# Patient Record
Sex: FEMALE | Race: OTHER | NOT HISPANIC OR LATINO | URBAN - METROPOLITAN AREA
[De-identification: names, ages, dates, MRNs, and addresses within clinical notes are randomized per-mention and may not be internally consistent; named-entity substitution may affect disease eponyms.]

---

## 2024-06-26 ENCOUNTER — INPATIENT (INPATIENT)
Facility: HOSPITAL | Age: 44
LOS: 9 days | Discharge: HOME | DRG: 64 | End: 2024-07-06
Attending: NEUROLOGICAL SURGERY | Admitting: NEUROLOGICAL SURGERY
Payer: MEDICAID

## 2024-06-26 VITALS
DIASTOLIC BLOOD PRESSURE: 85 MMHG | TEMPERATURE: 99 F | SYSTOLIC BLOOD PRESSURE: 159 MMHG | RESPIRATION RATE: 18 BRPM | OXYGEN SATURATION: 100 % | HEART RATE: 93 BPM

## 2024-06-26 DIAGNOSIS — J45.909 UNSPECIFIED ASTHMA, UNCOMPLICATED: ICD-10-CM

## 2024-06-26 DIAGNOSIS — I60.9 NONTRAUMATIC SUBARACHNOID HEMORRHAGE, UNSPECIFIED: ICD-10-CM

## 2024-06-26 DIAGNOSIS — I67.1 CEREBRAL ANEURYSM, NONRUPTURED: ICD-10-CM

## 2024-06-26 DIAGNOSIS — K29.50 UNSPECIFIED CHRONIC GASTRITIS WITHOUT BLEEDING: ICD-10-CM

## 2024-06-26 DIAGNOSIS — I67.841 REVERSIBLE CEREBROVASCULAR VASOCONSTRICTION SYNDROME: ICD-10-CM

## 2024-06-26 DIAGNOSIS — D64.9 ANEMIA, UNSPECIFIED: ICD-10-CM

## 2024-06-26 DIAGNOSIS — D72.829 ELEVATED WHITE BLOOD CELL COUNT, UNSPECIFIED: ICD-10-CM

## 2024-06-26 DIAGNOSIS — I67.82 CEREBRAL ISCHEMIA: ICD-10-CM

## 2024-06-26 DIAGNOSIS — R00.1 BRADYCARDIA, UNSPECIFIED: ICD-10-CM

## 2024-06-26 DIAGNOSIS — K21.9 GASTRO-ESOPHAGEAL REFLUX DISEASE WITHOUT ESOPHAGITIS: ICD-10-CM

## 2024-06-26 DIAGNOSIS — I62.01 NONTRAUMATIC ACUTE SUBDURAL HEMORRHAGE: ICD-10-CM

## 2024-06-26 DIAGNOSIS — G91.9 HYDROCEPHALUS, UNSPECIFIED: ICD-10-CM

## 2024-06-26 DIAGNOSIS — T38.0X5A ADVERSE EFFECT OF GLUCOCORTICOIDS AND SYNTHETIC ANALOGUES, INITIAL ENCOUNTER: ICD-10-CM

## 2024-06-26 DIAGNOSIS — G93.5 COMPRESSION OF BRAIN: ICD-10-CM

## 2024-06-26 LAB
A1C WITH ESTIMATED AVERAGE GLUCOSE RESULT: 5.1 % — SIGNIFICANT CHANGE UP (ref 4–5.6)
ADD ON TEST-SPECIMEN IN LAB: SIGNIFICANT CHANGE UP
ALBUMIN SERPL ELPH-MCNC: 4.9 G/DL — SIGNIFICANT CHANGE UP (ref 3.3–5)
ALP SERPL-CCNC: 72 U/L — SIGNIFICANT CHANGE UP (ref 40–120)
ALT FLD-CCNC: 14 U/L — SIGNIFICANT CHANGE UP (ref 10–45)
ANION GAP SERPL CALC-SCNC: 13 MMOL/L — SIGNIFICANT CHANGE UP (ref 5–17)
ANION GAP SERPL CALC-SCNC: 16 MMOL/L — SIGNIFICANT CHANGE UP (ref 5–17)
ANISOCYTOSIS BLD QL: SLIGHT — SIGNIFICANT CHANGE UP
APTT BLD: 32.9 SEC — SIGNIFICANT CHANGE UP (ref 24.5–35.6)
APTT BLD: 45.3 SEC — HIGH (ref 24.5–35.6)
AST SERPL-CCNC: 17 U/L — SIGNIFICANT CHANGE UP (ref 10–40)
BASOPHILS # BLD AUTO: 0 K/UL — SIGNIFICANT CHANGE UP (ref 0–0.2)
BASOPHILS # BLD AUTO: 0.04 K/UL — SIGNIFICANT CHANGE UP (ref 0–0.2)
BASOPHILS NFR BLD AUTO: 0 % — SIGNIFICANT CHANGE UP (ref 0–2)
BASOPHILS NFR BLD AUTO: 0.3 % — SIGNIFICANT CHANGE UP (ref 0–2)
BILIRUB SERPL-MCNC: 0.9 MG/DL — SIGNIFICANT CHANGE UP (ref 0.2–1.2)
BLD GP AB SCN SERPL QL: NEGATIVE — SIGNIFICANT CHANGE UP
BUN SERPL-MCNC: 6 MG/DL — LOW (ref 7–23)
BUN SERPL-MCNC: 7 MG/DL — SIGNIFICANT CHANGE UP (ref 7–23)
CALCIUM SERPL-MCNC: 10 MG/DL — SIGNIFICANT CHANGE UP (ref 8.4–10.5)
CALCIUM SERPL-MCNC: 8.5 MG/DL — SIGNIFICANT CHANGE UP (ref 8.4–10.5)
CHLORIDE SERPL-SCNC: 100 MMOL/L — SIGNIFICANT CHANGE UP (ref 96–108)
CHLORIDE SERPL-SCNC: 97 MMOL/L — SIGNIFICANT CHANGE UP (ref 96–108)
CHOLEST SERPL-MCNC: 226 MG/DL — HIGH
CO2 SERPL-SCNC: 21 MMOL/L — LOW (ref 22–31)
CO2 SERPL-SCNC: 24 MMOL/L — SIGNIFICANT CHANGE UP (ref 22–31)
CREAT SERPL-MCNC: 0.75 MG/DL — SIGNIFICANT CHANGE UP (ref 0.5–1.3)
CREAT SERPL-MCNC: 0.87 MG/DL — SIGNIFICANT CHANGE UP (ref 0.5–1.3)
EGFR: 101 ML/MIN/1.73M2 — SIGNIFICANT CHANGE UP
EGFR: 101 ML/MIN/1.73M2 — SIGNIFICANT CHANGE UP
EGFR: 84 ML/MIN/1.73M2 — SIGNIFICANT CHANGE UP
EGFR: 84 ML/MIN/1.73M2 — SIGNIFICANT CHANGE UP
EOSINOPHIL # BLD AUTO: 0 K/UL — SIGNIFICANT CHANGE UP (ref 0–0.5)
EOSINOPHIL # BLD AUTO: 0 K/UL — SIGNIFICANT CHANGE UP (ref 0–0.5)
EOSINOPHIL NFR BLD AUTO: 0 % — SIGNIFICANT CHANGE UP (ref 0–6)
EOSINOPHIL NFR BLD AUTO: 0 % — SIGNIFICANT CHANGE UP (ref 0–6)
ESTIMATED AVERAGE GLUCOSE: 100 MG/DL — SIGNIFICANT CHANGE UP (ref 68–114)
GIANT PLATELETS BLD QL SMEAR: PRESENT — SIGNIFICANT CHANGE UP
GLUCOSE BLDC GLUCOMTR-MCNC: 175 MG/DL — HIGH (ref 70–99)
GLUCOSE SERPL-MCNC: 115 MG/DL — HIGH (ref 70–99)
GLUCOSE SERPL-MCNC: 134 MG/DL — HIGH (ref 70–99)
HCG SERPL-ACNC: <1 MIU/ML — SIGNIFICANT CHANGE UP
HCT VFR BLD CALC: 32.1 % — LOW (ref 34.5–45)
HCT VFR BLD CALC: 38.4 % — SIGNIFICANT CHANGE UP (ref 34.5–45)
HDLC SERPL-MCNC: 89 MG/DL — SIGNIFICANT CHANGE UP
HGB BLD-MCNC: 10.8 G/DL — LOW (ref 11.5–15.5)
HGB BLD-MCNC: 13.2 G/DL — SIGNIFICANT CHANGE UP (ref 11.5–15.5)
IMM GRANULOCYTES NFR BLD AUTO: 0.6 % — SIGNIFICANT CHANGE UP (ref 0–0.9)
INR BLD: 1.06 — SIGNIFICANT CHANGE UP (ref 0.85–1.18)
ISTAT ACTK (ACTIVATED CLOTTING TIME KAOLIN): 134 SEC — SIGNIFICANT CHANGE UP (ref 74–137)
LDLC SERPL-MCNC: 128 MG/DL — HIGH
LIPID PNL WITH DIRECT LDL SERPL: 128 MG/DL — HIGH
LYMPHOCYTES # BLD AUTO: 1.02 K/UL — SIGNIFICANT CHANGE UP (ref 1–3.3)
LYMPHOCYTES # BLD AUTO: 1.2 K/UL — SIGNIFICANT CHANGE UP (ref 1–3.3)
LYMPHOCYTES # BLD AUTO: 9.5 % — LOW (ref 13–44)
LYMPHOCYTES # BLD AUTO: 9.7 % — LOW (ref 13–44)
MACROCYTES BLD QL: SLIGHT — SIGNIFICANT CHANGE UP
MAGNESIUM SERPL-MCNC: 1.8 MG/DL — SIGNIFICANT CHANGE UP (ref 1.6–2.6)
MAGNESIUM SERPL-MCNC: 2 MG/DL — SIGNIFICANT CHANGE UP (ref 1.6–2.6)
MANUAL SMEAR VERIFICATION: SIGNIFICANT CHANGE UP
MCHC RBC-ENTMCNC: 28.3 PG — SIGNIFICANT CHANGE UP (ref 27–34)
MCHC RBC-ENTMCNC: 28.3 PG — SIGNIFICANT CHANGE UP (ref 27–34)
MCHC RBC-ENTMCNC: 33.6 GM/DL — SIGNIFICANT CHANGE UP (ref 32–36)
MCHC RBC-ENTMCNC: 34.4 GM/DL — SIGNIFICANT CHANGE UP (ref 32–36)
MCV RBC AUTO: 82.4 FL — SIGNIFICANT CHANGE UP (ref 80–100)
MCV RBC AUTO: 84 FL — SIGNIFICANT CHANGE UP (ref 80–100)
MONOCYTES # BLD AUTO: 0.19 K/UL — SIGNIFICANT CHANGE UP (ref 0–0.9)
MONOCYTES # BLD AUTO: 0.74 K/UL — SIGNIFICANT CHANGE UP (ref 0–0.9)
MONOCYTES NFR BLD AUTO: 1.8 % — LOW (ref 2–14)
MONOCYTES NFR BLD AUTO: 5.8 % — SIGNIFICANT CHANGE UP (ref 2–14)
NEUTROPHILS # BLD AUTO: 10.64 K/UL — HIGH (ref 1.8–7.4)
NEUTROPHILS # BLD AUTO: 9.35 K/UL — HIGH (ref 1.8–7.4)
NEUTROPHILS NFR BLD AUTO: 83.8 % — HIGH (ref 43–77)
NEUTROPHILS NFR BLD AUTO: 88.5 % — HIGH (ref 43–77)
NONHDLC SERPL-MCNC: 137 MG/DL — HIGH
NRBC # BLD: 0 /100 WBCS — SIGNIFICANT CHANGE UP (ref 0–0)
NRBC BLD-RTO: 0 /100 WBCS — SIGNIFICANT CHANGE UP (ref 0–0)
PHOSPHATE SERPL-MCNC: 0.8 MG/DL — CRITICAL LOW (ref 2.5–4.5)
PHOSPHATE SERPL-MCNC: 4.9 MG/DL — HIGH (ref 2.5–4.5)
PLAT MORPH BLD: NORMAL — SIGNIFICANT CHANGE UP
PLATELET # BLD AUTO: 200 K/UL — SIGNIFICANT CHANGE UP (ref 150–400)
PLATELET # BLD AUTO: 246 K/UL — SIGNIFICANT CHANGE UP (ref 150–400)
POTASSIUM SERPL-MCNC: 3.2 MMOL/L — LOW (ref 3.5–5.3)
POTASSIUM SERPL-MCNC: 3.4 MMOL/L — LOW (ref 3.5–5.3)
POTASSIUM SERPL-SCNC: 3.2 MMOL/L — LOW (ref 3.5–5.3)
POTASSIUM SERPL-SCNC: 3.4 MMOL/L — LOW (ref 3.5–5.3)
PROT SERPL-MCNC: 8.5 G/DL — HIGH (ref 6–8.3)
PROTHROM AB SERPL-ACNC: 12.1 SEC — SIGNIFICANT CHANGE UP (ref 9.5–13)
RBC # BLD: 3.82 M/UL — SIGNIFICANT CHANGE UP (ref 3.8–5.2)
RBC # BLD: 4.66 M/UL — SIGNIFICANT CHANGE UP (ref 3.8–5.2)
RBC # FLD: 15.5 % — HIGH (ref 10.3–14.5)
RBC # FLD: 15.5 % — HIGH (ref 10.3–14.5)
RBC BLD AUTO: ABNORMAL
RH IG SCN BLD-IMP: POSITIVE — SIGNIFICANT CHANGE UP
SODIUM SERPL-SCNC: 134 MMOL/L — LOW (ref 135–145)
SODIUM SERPL-SCNC: 137 MMOL/L — SIGNIFICANT CHANGE UP (ref 135–145)
TRIGL SERPL-MCNC: 55 MG/DL — SIGNIFICANT CHANGE UP
WBC # BLD: 10.56 K/UL — HIGH (ref 3.8–10.5)
WBC # BLD: 12.69 K/UL — HIGH (ref 3.8–10.5)
WBC # FLD AUTO: 10.56 K/UL — HIGH (ref 3.8–10.5)
WBC # FLD AUTO: 12.69 K/UL — HIGH (ref 3.8–10.5)

## 2024-06-26 PROCEDURE — 70450 CT HEAD/BRAIN W/O DYE: CPT | Mod: 26

## 2024-06-26 PROCEDURE — 76377 3D RENDER W/INTRP POSTPROCES: CPT | Mod: 26

## 2024-06-26 PROCEDURE — 99291 CRITICAL CARE FIRST HOUR: CPT

## 2024-06-26 PROCEDURE — 36224 PLACE CATH CAROTD ART: CPT | Mod: 50

## 2024-06-26 PROCEDURE — 36226 PLACE CATH VERTEBRAL ART: CPT | Mod: LT

## 2024-06-26 PROCEDURE — 36227 PLACE CATH XTRNL CAROTID: CPT | Mod: 50

## 2024-06-26 PROCEDURE — 71045 X-RAY EXAM CHEST 1 VIEW: CPT | Mod: 26

## 2024-06-26 PROCEDURE — 76937 US GUIDE VASCULAR ACCESS: CPT | Mod: 26

## 2024-06-26 RX ORDER — ACETAMINOPHEN 500 MG/5ML
1000 LIQUID (ML) ORAL ONCE
Refills: 0 | Status: DISCONTINUED | OUTPATIENT
Start: 2024-06-26 | End: 2024-06-26

## 2024-06-26 RX ORDER — LEVETIRACETAM 10 MG/ML
500 INJECTION, SOLUTION INTRAVENOUS
Refills: 0 | Status: DISCONTINUED | OUTPATIENT
Start: 2024-06-26 | End: 2024-06-26

## 2024-06-26 RX ORDER — MAGNESIUM SULFATE 500 MG/ML
2 SYRINGE (ML) INJECTION ONCE
Refills: 0 | Status: COMPLETED | OUTPATIENT
Start: 2024-06-26 | End: 2024-06-26

## 2024-06-26 RX ORDER — OXYCODONE HYDROCHLORIDE 30 MG/1
5 TABLET ORAL ONCE
Refills: 0 | Status: DISCONTINUED | OUTPATIENT
Start: 2024-06-26 | End: 2024-06-26

## 2024-06-26 RX ORDER — INSULIN LISPRO 100 U/ML
INJECTION, SOLUTION INTRAVENOUS; SUBCUTANEOUS EVERY 6 HOURS
Refills: 0 | Status: DISCONTINUED | OUTPATIENT
Start: 2024-06-26 | End: 2024-06-28

## 2024-06-26 RX ORDER — DEXAMETHASONE 0.5 MG/1
10 TABLET ORAL ONCE
Refills: 0 | Status: DISCONTINUED | OUTPATIENT
Start: 2024-06-26 | End: 2024-06-26

## 2024-06-26 RX ORDER — ACETAMINOPHEN 500 MG/5ML
650 LIQUID (ML) ORAL EVERY 6 HOURS
Refills: 0 | Status: DISCONTINUED | OUTPATIENT
Start: 2024-06-26 | End: 2024-07-06

## 2024-06-26 RX ORDER — NICARDIPINE HCL 30 MG
5 CAPSULE ORAL
Qty: 40 | Refills: 0 | Status: DISCONTINUED | OUTPATIENT
Start: 2024-06-26 | End: 2024-06-27

## 2024-06-26 RX ORDER — AMINOCAPROIC ACID 0.25 G/ML
5 SOLUTION ORAL ONCE
Refills: 0 | Status: COMPLETED | OUTPATIENT
Start: 2024-06-26 | End: 2024-06-26

## 2024-06-26 RX ORDER — ACETAMINOPHEN 500 MG/5ML
1000 LIQUID (ML) ORAL ONCE
Refills: 0 | Status: COMPLETED | OUTPATIENT
Start: 2024-06-26 | End: 2024-06-26

## 2024-06-26 RX ORDER — AMINOCAPROIC ACID 0.25 G/ML
1 SOLUTION ORAL
Qty: 5 | Refills: 0 | Status: DISCONTINUED | OUTPATIENT
Start: 2024-06-26 | End: 2024-06-27

## 2024-06-26 RX ORDER — HYDROMORPHONE/SOD CHLOR,ISO/PF 2 MG/10 ML
0.5 SYRINGE (ML) INJECTION ONCE
Refills: 0 | Status: DISCONTINUED | OUTPATIENT
Start: 2024-06-26 | End: 2024-06-26

## 2024-06-26 RX ORDER — HYDROMORPHONE/SOD CHLOR,ISO/PF 2 MG/10 ML
0.25 SYRINGE (ML) INJECTION ONCE
Refills: 0 | Status: DISCONTINUED | OUTPATIENT
Start: 2024-06-26 | End: 2024-06-26

## 2024-06-26 RX ORDER — TRAMADOL HYDROCHLORIDE 50 MG/1
25 TABLET, FILM COATED ORAL ONCE
Refills: 0 | Status: DISCONTINUED | OUTPATIENT
Start: 2024-06-26 | End: 2024-06-26

## 2024-06-26 RX ORDER — OXYCODONE HYDROCHLORIDE 30 MG/1
10 TABLET ORAL EVERY 4 HOURS
Refills: 0 | Status: DISCONTINUED | OUTPATIENT
Start: 2024-06-26 | End: 2024-07-02

## 2024-06-26 RX ORDER — OXYCODONE HYDROCHLORIDE 30 MG/1
5 TABLET ORAL EVERY 4 HOURS
Refills: 0 | Status: DISCONTINUED | OUTPATIENT
Start: 2024-06-26 | End: 2024-07-02

## 2024-06-26 RX ORDER — IPRATROPIUM BROMIDE AND ALBUTEROL SULFATE .5; 2.5 MG/3ML; MG/3ML
3 SOLUTION RESPIRATORY (INHALATION) EVERY 6 HOURS
Refills: 0 | Status: DISCONTINUED | OUTPATIENT
Start: 2024-06-26 | End: 2024-06-27

## 2024-06-26 RX ORDER — ONDANSETRON HCL/PF 4 MG/2 ML
4 VIAL (ML) INJECTION EVERY 6 HOURS
Refills: 0 | Status: DISCONTINUED | OUTPATIENT
Start: 2024-06-26 | End: 2024-06-27

## 2024-06-26 RX ORDER — LEVETIRACETAM 10 MG/ML
500 INJECTION, SOLUTION INTRAVENOUS EVERY 12 HOURS
Refills: 0 | Status: DISCONTINUED | OUTPATIENT
Start: 2024-06-26 | End: 2024-06-28

## 2024-06-26 RX ORDER — NIMODIPINE 30 MG/1
60 CAPSULE, LIQUID FILLED ORAL EVERY 4 HOURS
Refills: 0 | Status: DISCONTINUED | OUTPATIENT
Start: 2024-06-26 | End: 2024-06-27

## 2024-06-26 RX ADMIN — Medication 1000 MILLIGRAM(S): at 15:56

## 2024-06-26 RX ADMIN — NIMODIPINE 60 MILLIGRAM(S): 30 CAPSULE, LIQUID FILLED ORAL at 14:33

## 2024-06-26 RX ADMIN — LEVETIRACETAM 600 MILLIGRAM(S): 10 INJECTION, SOLUTION INTRAVENOUS at 15:42

## 2024-06-26 RX ADMIN — Medication 75 MILLILITER(S): at 15:08

## 2024-06-26 RX ADMIN — Medication 400 MILLIGRAM(S): at 15:33

## 2024-06-26 RX ADMIN — Medication 0.25 MILLIGRAM(S): at 16:40

## 2024-06-26 RX ADMIN — Medication 1000 MILLIGRAM(S): at 16:15

## 2024-06-26 RX ADMIN — OXYCODONE HYDROCHLORIDE 5 MILLIGRAM(S): 30 TABLET ORAL at 21:30

## 2024-06-26 RX ADMIN — NIMODIPINE 60 MILLIGRAM(S): 30 CAPSULE, LIQUID FILLED ORAL at 21:41

## 2024-06-26 RX ADMIN — OXYCODONE HYDROCHLORIDE 5 MILLIGRAM(S): 30 TABLET ORAL at 22:23

## 2024-06-26 RX ADMIN — Medication 400 MILLIGRAM(S): at 20:49

## 2024-06-26 RX ADMIN — Medication 25 GRAM(S): at 21:30

## 2024-06-26 RX ADMIN — INSULIN LISPRO 2: 100 INJECTION, SOLUTION INTRAVENOUS; SUBCUTANEOUS at 21:32

## 2024-06-26 RX ADMIN — OXYCODONE HYDROCHLORIDE 5 MILLIGRAM(S): 30 TABLET ORAL at 15:33

## 2024-06-26 RX ADMIN — Medication 0.25 MILLIGRAM(S): at 16:26

## 2024-06-26 RX ADMIN — Medication 40 MILLIEQUIVALENT(S): at 20:49

## 2024-06-26 RX ADMIN — Medication 4 MILLIGRAM(S): at 14:33

## 2024-06-26 RX ADMIN — OXYCODONE HYDROCHLORIDE 5 MILLIGRAM(S): 30 TABLET ORAL at 14:33

## 2024-06-26 RX ADMIN — AMINOCAPROIC ACID 250 GRAM(S): 0.25 SOLUTION ORAL at 20:48

## 2024-06-26 RX ADMIN — Medication 1 APPLICATION(S): at 16:00

## 2024-06-26 RX ADMIN — Medication 5 MILLIGRAM(S): at 19:45

## 2024-06-26 RX ADMIN — Medication 400 MILLIGRAM(S): at 15:26

## 2024-06-26 RX ADMIN — IPRATROPIUM BROMIDE AND ALBUTEROL SULFATE 3 MILLILITER(S): .5; 2.5 SOLUTION RESPIRATORY (INHALATION) at 22:01

## 2024-06-26 RX ADMIN — IPRATROPIUM BROMIDE AND ALBUTEROL SULFATE 3 MILLILITER(S): .5; 2.5 SOLUTION RESPIRATORY (INHALATION) at 19:41

## 2024-06-26 RX ADMIN — Medication 1000 MILLIGRAM(S): at 21:19

## 2024-06-26 RX ADMIN — AMINOCAPROIC ACID 50 GM/HR: 0.25 SOLUTION ORAL at 21:55

## 2024-06-27 LAB
ANION GAP SERPL CALC-SCNC: 10 MMOL/L — SIGNIFICANT CHANGE UP (ref 5–17)
ANION GAP SERPL CALC-SCNC: 10 MMOL/L — SIGNIFICANT CHANGE UP (ref 5–17)
ANION GAP SERPL CALC-SCNC: 6 MMOL/L — SIGNIFICANT CHANGE UP (ref 5–17)
BUN SERPL-MCNC: 6 MG/DL — LOW (ref 7–23)
BUN SERPL-MCNC: 7 MG/DL — SIGNIFICANT CHANGE UP (ref 7–23)
BUN SERPL-MCNC: 7 MG/DL — SIGNIFICANT CHANGE UP (ref 7–23)
CALCIUM SERPL-MCNC: 8 MG/DL — LOW (ref 8.4–10.5)
CALCIUM SERPL-MCNC: 8.3 MG/DL — LOW (ref 8.4–10.5)
CALCIUM SERPL-MCNC: 8.6 MG/DL — SIGNIFICANT CHANGE UP (ref 8.4–10.5)
CHLORIDE SERPL-SCNC: 102 MMOL/L — SIGNIFICANT CHANGE UP (ref 96–108)
CHLORIDE SERPL-SCNC: 103 MMOL/L — SIGNIFICANT CHANGE UP (ref 96–108)
CHLORIDE SERPL-SCNC: 110 MMOL/L — HIGH (ref 96–108)
CO2 SERPL-SCNC: 20 MMOL/L — LOW (ref 22–31)
CO2 SERPL-SCNC: 21 MMOL/L — LOW (ref 22–31)
CO2 SERPL-SCNC: 22 MMOL/L — SIGNIFICANT CHANGE UP (ref 22–31)
CREAT SERPL-MCNC: 0.68 MG/DL — SIGNIFICANT CHANGE UP (ref 0.5–1.3)
CREAT SERPL-MCNC: 0.71 MG/DL — SIGNIFICANT CHANGE UP (ref 0.5–1.3)
CREAT SERPL-MCNC: 0.71 MG/DL — SIGNIFICANT CHANGE UP (ref 0.5–1.3)
EGFR: 107 ML/MIN/1.73M2 — SIGNIFICANT CHANGE UP
EGFR: 110 ML/MIN/1.73M2 — SIGNIFICANT CHANGE UP
EGFR: 110 ML/MIN/1.73M2 — SIGNIFICANT CHANGE UP
GLUCOSE BLDC GLUCOMTR-MCNC: 108 MG/DL — HIGH (ref 70–99)
GLUCOSE BLDC GLUCOMTR-MCNC: 110 MG/DL — HIGH (ref 70–99)
GLUCOSE BLDC GLUCOMTR-MCNC: 111 MG/DL — HIGH (ref 70–99)
GLUCOSE BLDC GLUCOMTR-MCNC: 156 MG/DL — HIGH (ref 70–99)
GLUCOSE SERPL-MCNC: 112 MG/DL — HIGH (ref 70–99)
GLUCOSE SERPL-MCNC: 118 MG/DL — HIGH (ref 70–99)
GLUCOSE SERPL-MCNC: 154 MG/DL — HIGH (ref 70–99)
HCT VFR BLD CALC: 29.6 % — LOW (ref 34.5–45)
HGB BLD-MCNC: 10 G/DL — LOW (ref 11.5–15.5)
MAGNESIUM SERPL-MCNC: 2.4 MG/DL — SIGNIFICANT CHANGE UP (ref 1.6–2.6)
MCHC RBC-ENTMCNC: 28.2 PG — SIGNIFICANT CHANGE UP (ref 27–34)
MCHC RBC-ENTMCNC: 33.8 GM/DL — SIGNIFICANT CHANGE UP (ref 32–36)
MCV RBC AUTO: 83.4 FL — SIGNIFICANT CHANGE UP (ref 80–100)
NRBC # BLD: 0 /100 WBCS — SIGNIFICANT CHANGE UP (ref 0–0)
NRBC BLD-RTO: 0 /100 WBCS — SIGNIFICANT CHANGE UP (ref 0–0)
PHOSPHATE SERPL-MCNC: 3 MG/DL — SIGNIFICANT CHANGE UP (ref 2.5–4.5)
PLATELET # BLD AUTO: 186 K/UL — SIGNIFICANT CHANGE UP (ref 150–400)
POTASSIUM SERPL-MCNC: 3.3 MMOL/L — LOW (ref 3.5–5.3)
POTASSIUM SERPL-MCNC: 4 MMOL/L — SIGNIFICANT CHANGE UP (ref 3.5–5.3)
POTASSIUM SERPL-MCNC: 4.2 MMOL/L — SIGNIFICANT CHANGE UP (ref 3.5–5.3)
POTASSIUM SERPL-SCNC: 3.3 MMOL/L — LOW (ref 3.5–5.3)
POTASSIUM SERPL-SCNC: 4 MMOL/L — SIGNIFICANT CHANGE UP (ref 3.5–5.3)
POTASSIUM SERPL-SCNC: 4.2 MMOL/L — SIGNIFICANT CHANGE UP (ref 3.5–5.3)
RBC # BLD: 3.55 M/UL — LOW (ref 3.8–5.2)
RBC # FLD: 15.5 % — HIGH (ref 10.3–14.5)
SODIUM SERPL-SCNC: 133 MMOL/L — LOW (ref 135–145)
SODIUM SERPL-SCNC: 133 MMOL/L — LOW (ref 135–145)
SODIUM SERPL-SCNC: 138 MMOL/L — SIGNIFICANT CHANGE UP (ref 135–145)
TROPONIN T, HIGH SENSITIVITY RESULT: 11 NG/L — SIGNIFICANT CHANGE UP (ref 0–51)
TSH SERPL-MCNC: 0.49 UIU/ML — SIGNIFICANT CHANGE UP (ref 0.27–4.2)
WBC # BLD: 12.75 K/UL — HIGH (ref 3.8–10.5)
WBC # FLD AUTO: 12.75 K/UL — HIGH (ref 3.8–10.5)

## 2024-06-27 PROCEDURE — 99291 CRITICAL CARE FIRST HOUR: CPT

## 2024-06-27 PROCEDURE — 93306 TTE W/DOPPLER COMPLETE: CPT | Mod: 26

## 2024-06-27 PROCEDURE — 72156 MRI NECK SPINE W/O & W/DYE: CPT | Mod: 26

## 2024-06-27 PROCEDURE — 70553 MRI BRAIN STEM W/O & W/DYE: CPT | Mod: 26

## 2024-06-27 PROCEDURE — 70450 CT HEAD/BRAIN W/O DYE: CPT | Mod: 26

## 2024-06-27 PROCEDURE — 70450 CT HEAD/BRAIN W/O DYE: CPT | Mod: 26,77

## 2024-06-27 RX ORDER — ACETAMINOPHEN 500 MG/5ML
1000 LIQUID (ML) ORAL ONCE
Refills: 0 | Status: COMPLETED | OUTPATIENT
Start: 2024-06-27 | End: 2024-06-27

## 2024-06-27 RX ORDER — SCOPOLAMINE 1 MG/3D
1 PATCH, EXTENDED RELEASE TRANSDERMAL ONCE
Refills: 0 | Status: COMPLETED | OUTPATIENT
Start: 2024-06-27 | End: 2024-06-27

## 2024-06-27 RX ORDER — HYDROMORPHONE/SOD CHLOR,ISO/PF 2 MG/10 ML
0.25 SYRINGE (ML) INJECTION ONCE
Refills: 0 | Status: DISCONTINUED | OUTPATIENT
Start: 2024-06-27 | End: 2024-06-27

## 2024-06-27 RX ORDER — IPRATROPIUM BROMIDE AND ALBUTEROL SULFATE .5; 2.5 MG/3ML; MG/3ML
3 SOLUTION RESPIRATORY (INHALATION) EVERY 6 HOURS
Refills: 0 | Status: DISCONTINUED | OUTPATIENT
Start: 2024-06-27 | End: 2024-07-06

## 2024-06-27 RX ORDER — SODIUM CHLORIDE 3 G/100ML
200 INJECTION, SOLUTION INTRAVENOUS ONCE
Refills: 0 | Status: COMPLETED | OUTPATIENT
Start: 2024-06-27 | End: 2024-06-27

## 2024-06-27 RX ORDER — ONDANSETRON HCL/PF 4 MG/2 ML
4 VIAL (ML) INJECTION EVERY 6 HOURS
Refills: 0 | Status: DISCONTINUED | OUTPATIENT
Start: 2024-06-27 | End: 2024-06-27

## 2024-06-27 RX ORDER — BUTALBITAL, ACETAMINOPHEN AND CAFFEINE 50; 325; 40 MG/1; MG/1; MG/1
1 TABLET ORAL EVERY 4 HOURS
Refills: 0 | Status: DISCONTINUED | OUTPATIENT
Start: 2024-06-27 | End: 2024-07-06

## 2024-06-27 RX ORDER — METOCLOPRAMIDE HCL 10 MG
10 TABLET ORAL ONCE
Refills: 0 | Status: COMPLETED | OUTPATIENT
Start: 2024-06-27 | End: 2024-06-27

## 2024-06-27 RX ORDER — SODIUM CHLORIDE 3 G/100ML
500 INJECTION, SOLUTION INTRAVENOUS
Refills: 0 | Status: DISCONTINUED | OUTPATIENT
Start: 2024-06-27 | End: 2024-06-27

## 2024-06-27 RX ORDER — DEXAMETHASONE 0.5 MG/1
2 TABLET ORAL EVERY 12 HOURS
Refills: 0 | Status: COMPLETED | OUTPATIENT
Start: 2024-06-30 | End: 2024-06-30

## 2024-06-27 RX ORDER — DEXAMETHASONE 0.5 MG/1
4 TABLET ORAL EVERY 6 HOURS
Refills: 0 | Status: COMPLETED | OUTPATIENT
Start: 2024-06-27 | End: 2024-06-28

## 2024-06-27 RX ORDER — ONDANSETRON HCL/PF 4 MG/2 ML
4 VIAL (ML) INJECTION EVERY 6 HOURS
Refills: 0 | Status: DISCONTINUED | OUTPATIENT
Start: 2024-06-27 | End: 2024-07-01

## 2024-06-27 RX ORDER — SENNA 187 MG
2 TABLET ORAL AT BEDTIME
Refills: 0 | Status: DISCONTINUED | OUTPATIENT
Start: 2024-06-27 | End: 2024-07-01

## 2024-06-27 RX ORDER — DEXAMETHASONE 0.5 MG/1
6 TABLET ORAL ONCE
Refills: 0 | Status: COMPLETED | OUTPATIENT
Start: 2024-06-27 | End: 2024-06-27

## 2024-06-27 RX ORDER — DEXAMETHASONE 0.5 MG/1
4 TABLET ORAL EVERY 12 HOURS
Refills: 0 | Status: COMPLETED | OUTPATIENT
Start: 2024-06-29 | End: 2024-06-29

## 2024-06-27 RX ORDER — SODIUM CHLORIDE 3 G/100ML
500 INJECTION, SOLUTION INTRAVENOUS
Refills: 0 | Status: DISCONTINUED | OUTPATIENT
Start: 2024-06-27 | End: 2024-06-29

## 2024-06-27 RX ORDER — NIMODIPINE 30 MG/1
30 CAPSULE, LIQUID FILLED ORAL
Refills: 0 | Status: DISCONTINUED | OUTPATIENT
Start: 2024-06-27 | End: 2024-06-29

## 2024-06-27 RX ORDER — DEXAMETHASONE 0.5 MG/1
TABLET ORAL
Refills: 0 | Status: COMPLETED | OUTPATIENT
Start: 2024-06-27 | End: 2024-07-01

## 2024-06-27 RX ADMIN — OXYCODONE HYDROCHLORIDE 10 MILLIGRAM(S): 30 TABLET ORAL at 11:04

## 2024-06-27 RX ADMIN — OXYCODONE HYDROCHLORIDE 5 MILLIGRAM(S): 30 TABLET ORAL at 00:15

## 2024-06-27 RX ADMIN — INSULIN LISPRO 2: 100 INJECTION, SOLUTION INTRAVENOUS; SUBCUTANEOUS at 06:55

## 2024-06-27 RX ADMIN — IPRATROPIUM BROMIDE AND ALBUTEROL SULFATE 3 MILLILITER(S): .5; 2.5 SOLUTION RESPIRATORY (INHALATION) at 05:37

## 2024-06-27 RX ADMIN — Medication 10 MILLIGRAM(S): at 15:34

## 2024-06-27 RX ADMIN — Medication 40 MILLIEQUIVALENT(S): at 06:46

## 2024-06-27 RX ADMIN — OXYCODONE HYDROCHLORIDE 10 MILLIGRAM(S): 30 TABLET ORAL at 11:15

## 2024-06-27 RX ADMIN — LEVETIRACETAM 600 MILLIGRAM(S): 10 INJECTION, SOLUTION INTRAVENOUS at 17:25

## 2024-06-27 RX ADMIN — SODIUM CHLORIDE 30 MILLILITER(S): 3 INJECTION, SOLUTION INTRAVENOUS at 06:56

## 2024-06-27 RX ADMIN — Medication 1000 MILLIGRAM(S): at 17:15

## 2024-06-27 RX ADMIN — BUTALBITAL, ACETAMINOPHEN AND CAFFEINE 1 CAPSULE(S): 50; 325; 40 TABLET ORAL at 21:00

## 2024-06-27 RX ADMIN — BUTALBITAL, ACETAMINOPHEN AND CAFFEINE 1 CAPSULE(S): 50; 325; 40 TABLET ORAL at 15:33

## 2024-06-27 RX ADMIN — Medication 20 MILLIEQUIVALENT(S): at 00:15

## 2024-06-27 RX ADMIN — SODIUM CHLORIDE 200 MILLILITER(S): 3 INJECTION, SOLUTION INTRAVENOUS at 00:15

## 2024-06-27 RX ADMIN — Medication 0.25 MILLIGRAM(S): at 12:23

## 2024-06-27 RX ADMIN — Medication 400 MILLIGRAM(S): at 16:49

## 2024-06-27 RX ADMIN — Medication 40 MILLIEQUIVALENT(S): at 11:05

## 2024-06-27 RX ADMIN — Medication 1000 MILLIGRAM(S): at 04:33

## 2024-06-27 RX ADMIN — Medication 40 MILLIGRAM(S): at 12:29

## 2024-06-27 RX ADMIN — IPRATROPIUM BROMIDE AND ALBUTEROL SULFATE 3 MILLILITER(S): .5; 2.5 SOLUTION RESPIRATORY (INHALATION) at 21:06

## 2024-06-27 RX ADMIN — SCOPOLAMINE 1 PATCH: 1 PATCH, EXTENDED RELEASE TRANSDERMAL at 20:20

## 2024-06-27 RX ADMIN — Medication 200 MILLIGRAM(S): at 21:08

## 2024-06-27 RX ADMIN — Medication 1000 MILLILITER(S): at 22:01

## 2024-06-27 RX ADMIN — OXYCODONE HYDROCHLORIDE 10 MILLIGRAM(S): 30 TABLET ORAL at 19:14

## 2024-06-27 RX ADMIN — Medication 0.25 MILLIGRAM(S): at 11:49

## 2024-06-27 RX ADMIN — DEXAMETHASONE 4 MILLIGRAM(S): 0.5 TABLET ORAL at 17:06

## 2024-06-27 RX ADMIN — NIMODIPINE 30 MILLIGRAM(S): 30 CAPSULE, LIQUID FILLED ORAL at 11:04

## 2024-06-27 RX ADMIN — Medication 75 MILLILITER(S): at 20:00

## 2024-06-27 RX ADMIN — Medication 0.25 MILLIGRAM(S): at 05:00

## 2024-06-27 RX ADMIN — Medication 1 APPLICATION(S): at 11:06

## 2024-06-27 RX ADMIN — DEXAMETHASONE 6 MILLIGRAM(S): 0.5 TABLET ORAL at 00:14

## 2024-06-27 RX ADMIN — Medication 2 TABLET(S): at 21:09

## 2024-06-27 RX ADMIN — NIMODIPINE 30 MILLIGRAM(S): 30 CAPSULE, LIQUID FILLED ORAL at 13:54

## 2024-06-27 RX ADMIN — Medication 200 MILLIGRAM(S): at 15:55

## 2024-06-27 RX ADMIN — AMINOCAPROIC ACID 50 GM/HR: 0.25 SOLUTION ORAL at 04:33

## 2024-06-27 RX ADMIN — Medication 0.25 MILLIGRAM(S): at 16:59

## 2024-06-27 RX ADMIN — BUTALBITAL, ACETAMINOPHEN AND CAFFEINE 1 CAPSULE(S): 50; 325; 40 TABLET ORAL at 15:15

## 2024-06-27 RX ADMIN — NIMODIPINE 30 MILLIGRAM(S): 30 CAPSULE, LIQUID FILLED ORAL at 22:00

## 2024-06-27 RX ADMIN — OXYCODONE HYDROCHLORIDE 10 MILLIGRAM(S): 30 TABLET ORAL at 20:00

## 2024-06-27 RX ADMIN — IPRATROPIUM BROMIDE AND ALBUTEROL SULFATE 3 MILLILITER(S): .5; 2.5 SOLUTION RESPIRATORY (INHALATION) at 17:05

## 2024-06-27 RX ADMIN — BUTALBITAL, ACETAMINOPHEN AND CAFFEINE 1 CAPSULE(S): 50; 325; 40 TABLET ORAL at 20:36

## 2024-06-27 RX ADMIN — Medication 0.25 MILLIGRAM(S): at 04:33

## 2024-06-27 RX ADMIN — Medication 400 MILLIGRAM(S): at 04:16

## 2024-06-27 RX ADMIN — Medication 45 MILLILITER(S): at 06:56

## 2024-06-27 RX ADMIN — Medication 0.25 MILLIGRAM(S): at 16:49

## 2024-06-27 RX ADMIN — LEVETIRACETAM 600 MILLIGRAM(S): 10 INJECTION, SOLUTION INTRAVENOUS at 05:31

## 2024-06-27 RX ADMIN — Medication 10 MILLIGRAM(S): at 17:25

## 2024-06-28 LAB
ANION GAP SERPL CALC-SCNC: 11 MMOL/L — SIGNIFICANT CHANGE UP (ref 5–17)
ANION GAP SERPL CALC-SCNC: 8 MMOL/L — SIGNIFICANT CHANGE UP (ref 5–17)
BUN SERPL-MCNC: 10 MG/DL — SIGNIFICANT CHANGE UP (ref 7–23)
BUN SERPL-MCNC: 7 MG/DL — SIGNIFICANT CHANGE UP (ref 7–23)
CALCIUM SERPL-MCNC: 8.7 MG/DL — SIGNIFICANT CHANGE UP (ref 8.4–10.5)
CALCIUM SERPL-MCNC: 8.8 MG/DL — SIGNIFICANT CHANGE UP (ref 8.4–10.5)
CHLORIDE SERPL-SCNC: 103 MMOL/L — SIGNIFICANT CHANGE UP (ref 96–108)
CHLORIDE SERPL-SCNC: 107 MMOL/L — SIGNIFICANT CHANGE UP (ref 96–108)
CO2 SERPL-SCNC: 21 MMOL/L — LOW (ref 22–31)
CO2 SERPL-SCNC: 22 MMOL/L — SIGNIFICANT CHANGE UP (ref 22–31)
CREAT SERPL-MCNC: 0.66 MG/DL — SIGNIFICANT CHANGE UP (ref 0.5–1.3)
CREAT SERPL-MCNC: 0.71 MG/DL — SIGNIFICANT CHANGE UP (ref 0.5–1.3)
EGFR: 107 ML/MIN/1.73M2 — SIGNIFICANT CHANGE UP
EGFR: 107 ML/MIN/1.73M2 — SIGNIFICANT CHANGE UP
EGFR: 111 ML/MIN/1.73M2 — SIGNIFICANT CHANGE UP
EGFR: 111 ML/MIN/1.73M2 — SIGNIFICANT CHANGE UP
GLUCOSE BLDC GLUCOMTR-MCNC: 113 MG/DL — HIGH (ref 70–99)
GLUCOSE BLDC GLUCOMTR-MCNC: 129 MG/DL — HIGH (ref 70–99)
GLUCOSE BLDC GLUCOMTR-MCNC: 135 MG/DL — HIGH (ref 70–99)
GLUCOSE SERPL-MCNC: 126 MG/DL — HIGH (ref 70–99)
GLUCOSE SERPL-MCNC: 127 MG/DL — HIGH (ref 70–99)
HCT VFR BLD CALC: 32.2 % — LOW (ref 34.5–45)
HGB BLD-MCNC: 10.8 G/DL — LOW (ref 11.5–15.5)
MAGNESIUM SERPL-MCNC: 1.9 MG/DL — SIGNIFICANT CHANGE UP (ref 1.6–2.6)
MCHC RBC-ENTMCNC: 28.2 PG — SIGNIFICANT CHANGE UP (ref 27–34)
MCHC RBC-ENTMCNC: 33.5 GM/DL — SIGNIFICANT CHANGE UP (ref 32–36)
MCV RBC AUTO: 84.1 FL — SIGNIFICANT CHANGE UP (ref 80–100)
NRBC # BLD: 0 /100 WBCS — SIGNIFICANT CHANGE UP (ref 0–0)
NRBC BLD-RTO: 0 /100 WBCS — SIGNIFICANT CHANGE UP (ref 0–0)
OSMOLALITY UR: 396 MOSM/KG — SIGNIFICANT CHANGE UP (ref 300–900)
PHOSPHATE SERPL-MCNC: 2.6 MG/DL — SIGNIFICANT CHANGE UP (ref 2.5–4.5)
PLATELET # BLD AUTO: 190 K/UL — SIGNIFICANT CHANGE UP (ref 150–400)
POTASSIUM SERPL-MCNC: 3.8 MMOL/L — SIGNIFICANT CHANGE UP (ref 3.5–5.3)
POTASSIUM SERPL-MCNC: 4 MMOL/L — SIGNIFICANT CHANGE UP (ref 3.5–5.3)
POTASSIUM SERPL-SCNC: 3.8 MMOL/L — SIGNIFICANT CHANGE UP (ref 3.5–5.3)
POTASSIUM SERPL-SCNC: 4 MMOL/L — SIGNIFICANT CHANGE UP (ref 3.5–5.3)
RBC # BLD: 3.83 M/UL — SIGNIFICANT CHANGE UP (ref 3.8–5.2)
RBC # FLD: 16 % — HIGH (ref 10.3–14.5)
SODIUM SERPL-SCNC: 135 MMOL/L — SIGNIFICANT CHANGE UP (ref 135–145)
SODIUM SERPL-SCNC: 137 MMOL/L — SIGNIFICANT CHANGE UP (ref 135–145)
SODIUM UR-SCNC: 170 MMOL/L — SIGNIFICANT CHANGE UP
WBC # BLD: 12.1 K/UL — HIGH (ref 3.8–10.5)
WBC # FLD AUTO: 12.1 K/UL — HIGH (ref 3.8–10.5)

## 2024-06-28 PROCEDURE — 99291 CRITICAL CARE FIRST HOUR: CPT

## 2024-06-28 PROCEDURE — 99232 SBSQ HOSP IP/OBS MODERATE 35: CPT

## 2024-06-28 RX ORDER — SOD PHOS DI, MONO/K PHOS MONO 250 MG
1 TABLET ORAL ONCE
Refills: 0 | Status: COMPLETED | OUTPATIENT
Start: 2024-06-28 | End: 2024-06-28

## 2024-06-28 RX ORDER — LEVETIRACETAM 10 MG/ML
500 INJECTION, SOLUTION INTRAVENOUS EVERY 12 HOURS
Refills: 0 | Status: DISCONTINUED | OUTPATIENT
Start: 2024-06-28 | End: 2024-07-01

## 2024-06-28 RX ADMIN — NIMODIPINE 30 MILLIGRAM(S): 30 CAPSULE, LIQUID FILLED ORAL at 13:43

## 2024-06-28 RX ADMIN — OXYCODONE HYDROCHLORIDE 10 MILLIGRAM(S): 30 TABLET ORAL at 01:00

## 2024-06-28 RX ADMIN — OXYCODONE HYDROCHLORIDE 10 MILLIGRAM(S): 30 TABLET ORAL at 07:00

## 2024-06-28 RX ADMIN — Medication 3 GRAM(S): at 19:13

## 2024-06-28 RX ADMIN — SODIUM CHLORIDE 30 MILLILITER(S): 3 INJECTION, SOLUTION INTRAVENOUS at 00:04

## 2024-06-28 RX ADMIN — OXYCODONE HYDROCHLORIDE 10 MILLIGRAM(S): 30 TABLET ORAL at 18:08

## 2024-06-28 RX ADMIN — DEXAMETHASONE 4 MILLIGRAM(S): 0.5 TABLET ORAL at 00:02

## 2024-06-28 RX ADMIN — Medication 650 MILLIGRAM(S): at 05:01

## 2024-06-28 RX ADMIN — Medication 200 MILLIGRAM(S): at 03:04

## 2024-06-28 RX ADMIN — Medication 3 GRAM(S): at 05:40

## 2024-06-28 RX ADMIN — Medication 1 PACKET(S): at 07:15

## 2024-06-28 RX ADMIN — NIMODIPINE 30 MILLIGRAM(S): 30 CAPSULE, LIQUID FILLED ORAL at 19:13

## 2024-06-28 RX ADMIN — BUTALBITAL, ACETAMINOPHEN AND CAFFEINE 1 CAPSULE(S): 50; 325; 40 TABLET ORAL at 13:53

## 2024-06-28 RX ADMIN — Medication 1000 MILLILITER(S): at 05:21

## 2024-06-28 RX ADMIN — NIMODIPINE 30 MILLIGRAM(S): 30 CAPSULE, LIQUID FILLED ORAL at 06:02

## 2024-06-28 RX ADMIN — Medication 650 MILLIGRAM(S): at 04:08

## 2024-06-28 RX ADMIN — BUTALBITAL, ACETAMINOPHEN AND CAFFEINE 1 CAPSULE(S): 50; 325; 40 TABLET ORAL at 10:58

## 2024-06-28 RX ADMIN — SCOPOLAMINE 1 PATCH: 1 PATCH, EXTENDED RELEASE TRANSDERMAL at 06:01

## 2024-06-28 RX ADMIN — BUTALBITAL, ACETAMINOPHEN AND CAFFEINE 1 CAPSULE(S): 50; 325; 40 TABLET ORAL at 03:00

## 2024-06-28 RX ADMIN — NIMODIPINE 30 MILLIGRAM(S): 30 CAPSULE, LIQUID FILLED ORAL at 00:03

## 2024-06-28 RX ADMIN — Medication 500 MILLILITER(S): at 12:11

## 2024-06-28 RX ADMIN — Medication 20 MILLIEQUIVALENT(S): at 07:15

## 2024-06-28 RX ADMIN — NIMODIPINE 30 MILLIGRAM(S): 30 CAPSULE, LIQUID FILLED ORAL at 02:04

## 2024-06-28 RX ADMIN — NIMODIPINE 30 MILLIGRAM(S): 30 CAPSULE, LIQUID FILLED ORAL at 09:09

## 2024-06-28 RX ADMIN — Medication 45 MILLILITER(S): at 00:05

## 2024-06-28 RX ADMIN — Medication 2 TABLET(S): at 21:31

## 2024-06-28 RX ADMIN — IPRATROPIUM BROMIDE AND ALBUTEROL SULFATE 3 MILLILITER(S): .5; 2.5 SOLUTION RESPIRATORY (INHALATION) at 04:06

## 2024-06-28 RX ADMIN — Medication 40 MILLIGRAM(S): at 11:10

## 2024-06-28 RX ADMIN — NIMODIPINE 30 MILLIGRAM(S): 30 CAPSULE, LIQUID FILLED ORAL at 21:31

## 2024-06-28 RX ADMIN — Medication 650 MILLIGRAM(S): at 19:24

## 2024-06-28 RX ADMIN — NIMODIPINE 30 MILLIGRAM(S): 30 CAPSULE, LIQUID FILLED ORAL at 16:00

## 2024-06-28 RX ADMIN — BUTALBITAL, ACETAMINOPHEN AND CAFFEINE 1 CAPSULE(S): 50; 325; 40 TABLET ORAL at 02:04

## 2024-06-28 RX ADMIN — NIMODIPINE 30 MILLIGRAM(S): 30 CAPSULE, LIQUID FILLED ORAL at 04:08

## 2024-06-28 RX ADMIN — NIMODIPINE 30 MILLIGRAM(S): 30 CAPSULE, LIQUID FILLED ORAL at 23:25

## 2024-06-28 RX ADMIN — NIMODIPINE 30 MILLIGRAM(S): 30 CAPSULE, LIQUID FILLED ORAL at 07:45

## 2024-06-28 RX ADMIN — Medication 10 MILLIGRAM(S): at 13:43

## 2024-06-28 RX ADMIN — NIMODIPINE 30 MILLIGRAM(S): 30 CAPSULE, LIQUID FILLED ORAL at 11:10

## 2024-06-28 RX ADMIN — DEXAMETHASONE 4 MILLIGRAM(S): 0.5 TABLET ORAL at 05:40

## 2024-06-28 RX ADMIN — OXYCODONE HYDROCHLORIDE 10 MILLIGRAM(S): 30 TABLET ORAL at 05:40

## 2024-06-28 RX ADMIN — BUTALBITAL, ACETAMINOPHEN AND CAFFEINE 1 CAPSULE(S): 50; 325; 40 TABLET ORAL at 14:26

## 2024-06-28 RX ADMIN — OXYCODONE HYDROCHLORIDE 10 MILLIGRAM(S): 30 TABLET ORAL at 00:02

## 2024-06-28 RX ADMIN — OXYCODONE HYDROCHLORIDE 10 MILLIGRAM(S): 30 TABLET ORAL at 14:50

## 2024-06-28 RX ADMIN — SCOPOLAMINE 1 PATCH: 1 PATCH, EXTENDED RELEASE TRANSDERMAL at 18:40

## 2024-06-28 RX ADMIN — LEVETIRACETAM 600 MILLIGRAM(S): 10 INJECTION, SOLUTION INTRAVENOUS at 06:25

## 2024-06-28 RX ADMIN — Medication 500 MILLILITER(S): at 18:08

## 2024-06-28 RX ADMIN — LEVETIRACETAM 400 MILLIGRAM(S): 10 INJECTION, SOLUTION INTRAVENOUS at 19:12

## 2024-06-28 RX ADMIN — Medication 1 APPLICATION(S): at 12:11

## 2024-06-28 RX ADMIN — Medication 650 MILLIGRAM(S): at 19:18

## 2024-06-28 RX ADMIN — BUTALBITAL, ACETAMINOPHEN AND CAFFEINE 1 CAPSULE(S): 50; 325; 40 TABLET ORAL at 09:07

## 2024-06-28 RX ADMIN — Medication 4 MILLIGRAM(S): at 09:07

## 2024-06-28 RX ADMIN — DEXAMETHASONE 4 MILLIGRAM(S): 0.5 TABLET ORAL at 11:10

## 2024-06-29 LAB
ANION GAP SERPL CALC-SCNC: 10 MMOL/L — SIGNIFICANT CHANGE UP (ref 5–17)
ANION GAP SERPL CALC-SCNC: 7 MMOL/L — SIGNIFICANT CHANGE UP (ref 5–17)
ANION GAP SERPL CALC-SCNC: 7 MMOL/L — SIGNIFICANT CHANGE UP (ref 5–17)
BUN SERPL-MCNC: 10 MG/DL — SIGNIFICANT CHANGE UP (ref 7–23)
BUN SERPL-MCNC: 14 MG/DL — SIGNIFICANT CHANGE UP (ref 7–23)
BUN SERPL-MCNC: 7 MG/DL — SIGNIFICANT CHANGE UP (ref 7–23)
CALCIUM SERPL-MCNC: 7.9 MG/DL — LOW (ref 8.4–10.5)
CALCIUM SERPL-MCNC: 8.1 MG/DL — LOW (ref 8.4–10.5)
CALCIUM SERPL-MCNC: 8.8 MG/DL — SIGNIFICANT CHANGE UP (ref 8.4–10.5)
CHLORIDE SERPL-SCNC: 103 MMOL/L — SIGNIFICANT CHANGE UP (ref 96–108)
CHLORIDE SERPL-SCNC: 107 MMOL/L — SIGNIFICANT CHANGE UP (ref 96–108)
CHLORIDE SERPL-SCNC: 108 MMOL/L — SIGNIFICANT CHANGE UP (ref 96–108)
CO2 SERPL-SCNC: 21 MMOL/L — LOW (ref 22–31)
CREAT SERPL-MCNC: 0.65 MG/DL — SIGNIFICANT CHANGE UP (ref 0.5–1.3)
CREAT SERPL-MCNC: 0.72 MG/DL — SIGNIFICANT CHANGE UP (ref 0.5–1.3)
CREAT SERPL-MCNC: 0.78 MG/DL — SIGNIFICANT CHANGE UP (ref 0.5–1.3)
EGFR: 106 ML/MIN/1.73M2 — SIGNIFICANT CHANGE UP
EGFR: 106 ML/MIN/1.73M2 — SIGNIFICANT CHANGE UP
EGFR: 111 ML/MIN/1.73M2 — SIGNIFICANT CHANGE UP
EGFR: 111 ML/MIN/1.73M2 — SIGNIFICANT CHANGE UP
EGFR: 96 ML/MIN/1.73M2 — SIGNIFICANT CHANGE UP
EGFR: 96 ML/MIN/1.73M2 — SIGNIFICANT CHANGE UP
GLUCOSE SERPL-MCNC: 100 MG/DL — HIGH (ref 70–99)
GLUCOSE SERPL-MCNC: 117 MG/DL — HIGH (ref 70–99)
GLUCOSE SERPL-MCNC: 91 MG/DL — SIGNIFICANT CHANGE UP (ref 70–99)
HCT VFR BLD CALC: 29.2 % — LOW (ref 34.5–45)
HGB BLD-MCNC: 9.8 G/DL — LOW (ref 11.5–15.5)
MAGNESIUM SERPL-MCNC: 1.8 MG/DL — SIGNIFICANT CHANGE UP (ref 1.6–2.6)
MCHC RBC-ENTMCNC: 28.2 PG — SIGNIFICANT CHANGE UP (ref 27–34)
MCHC RBC-ENTMCNC: 33.6 GM/DL — SIGNIFICANT CHANGE UP (ref 32–36)
MCV RBC AUTO: 84.1 FL — SIGNIFICANT CHANGE UP (ref 80–100)
NRBC # BLD: 0 /100 WBCS — SIGNIFICANT CHANGE UP (ref 0–0)
NRBC BLD-RTO: 0 /100 WBCS — SIGNIFICANT CHANGE UP (ref 0–0)
PHOSPHATE SERPL-MCNC: 2.1 MG/DL — LOW (ref 2.5–4.5)
PLATELET # BLD AUTO: 161 K/UL — SIGNIFICANT CHANGE UP (ref 150–400)
POTASSIUM SERPL-MCNC: 3.4 MMOL/L — LOW (ref 3.5–5.3)
POTASSIUM SERPL-MCNC: 3.7 MMOL/L — SIGNIFICANT CHANGE UP (ref 3.5–5.3)
POTASSIUM SERPL-MCNC: 4.3 MMOL/L — SIGNIFICANT CHANGE UP (ref 3.5–5.3)
POTASSIUM SERPL-SCNC: 3.4 MMOL/L — LOW (ref 3.5–5.3)
POTASSIUM SERPL-SCNC: 3.7 MMOL/L — SIGNIFICANT CHANGE UP (ref 3.5–5.3)
POTASSIUM SERPL-SCNC: 4.3 MMOL/L — SIGNIFICANT CHANGE UP (ref 3.5–5.3)
RBC # BLD: 3.47 M/UL — LOW (ref 3.8–5.2)
RBC # FLD: 16.1 % — HIGH (ref 10.3–14.5)
SODIUM SERPL-SCNC: 134 MMOL/L — LOW (ref 135–145)
SODIUM SERPL-SCNC: 135 MMOL/L — SIGNIFICANT CHANGE UP (ref 135–145)
SODIUM SERPL-SCNC: 136 MMOL/L — SIGNIFICANT CHANGE UP (ref 135–145)
WBC # BLD: 11.13 K/UL — HIGH (ref 3.8–10.5)
WBC # FLD AUTO: 11.13 K/UL — HIGH (ref 3.8–10.5)

## 2024-06-29 PROCEDURE — 99291 CRITICAL CARE FIRST HOUR: CPT

## 2024-06-29 RX ORDER — MAGNESIUM SULFATE 500 MG/ML
2 SYRINGE (ML) INJECTION ONCE
Refills: 0 | Status: COMPLETED | OUTPATIENT
Start: 2024-06-29 | End: 2024-06-29

## 2024-06-29 RX ORDER — TRAMADOL HYDROCHLORIDE 50 MG/1
50 TABLET, FILM COATED ORAL ONCE
Refills: 0 | Status: DISCONTINUED | OUTPATIENT
Start: 2024-06-29 | End: 2024-06-29

## 2024-06-29 RX ORDER — SOD PHOS DI, MONO/K PHOS MONO 250 MG
1 TABLET ORAL ONCE
Refills: 0 | Status: COMPLETED | OUTPATIENT
Start: 2024-06-29 | End: 2024-06-29

## 2024-06-29 RX ORDER — NIMODIPINE 30 MG/1
30 CAPSULE, LIQUID FILLED ORAL
Refills: 0 | Status: DISCONTINUED | OUTPATIENT
Start: 2024-06-29 | End: 2024-07-01

## 2024-06-29 RX ORDER — POLYETHYLENE GLYCOL 3350 17 G/17G
17 POWDER, FOR SOLUTION ORAL
Refills: 0 | Status: DISCONTINUED | OUTPATIENT
Start: 2024-06-29 | End: 2024-07-01

## 2024-06-29 RX ORDER — POLYETHYLENE GLYCOL 3350 17 G/17G
17 POWDER, FOR SOLUTION ORAL DAILY
Refills: 0 | Status: DISCONTINUED | OUTPATIENT
Start: 2024-06-29 | End: 2024-06-29

## 2024-06-29 RX ORDER — ACETAMINOPHEN 500 MG/5ML
1000 LIQUID (ML) ORAL ONCE
Refills: 0 | Status: COMPLETED | OUTPATIENT
Start: 2024-06-29 | End: 2024-06-29

## 2024-06-29 RX ORDER — SODIUM CHLORIDE 3 G/100ML
500 INJECTION, SOLUTION INTRAVENOUS
Refills: 0 | Status: DISCONTINUED | OUTPATIENT
Start: 2024-06-29 | End: 2024-07-01

## 2024-06-29 RX ADMIN — Medication 10 MILLIGRAM(S): at 16:03

## 2024-06-29 RX ADMIN — DEXAMETHASONE 4 MILLIGRAM(S): 0.5 TABLET ORAL at 17:04

## 2024-06-29 RX ADMIN — SCOPOLAMINE 1 PATCH: 1 PATCH, EXTENDED RELEASE TRANSDERMAL at 19:41

## 2024-06-29 RX ADMIN — Medication 40 MILLIEQUIVALENT(S): at 09:05

## 2024-06-29 RX ADMIN — SODIUM CHLORIDE 30 MILLILITER(S): 3 INJECTION, SOLUTION INTRAVENOUS at 11:58

## 2024-06-29 RX ADMIN — Medication 400 MILLIGRAM(S): at 15:07

## 2024-06-29 RX ADMIN — NIMODIPINE 30 MILLIGRAM(S): 30 CAPSULE, LIQUID FILLED ORAL at 15:07

## 2024-06-29 RX ADMIN — OXYCODONE HYDROCHLORIDE 10 MILLIGRAM(S): 30 TABLET ORAL at 23:00

## 2024-06-29 RX ADMIN — Medication 650 MILLIGRAM(S): at 20:32

## 2024-06-29 RX ADMIN — BUTALBITAL, ACETAMINOPHEN AND CAFFEINE 1 CAPSULE(S): 50; 325; 40 TABLET ORAL at 13:00

## 2024-06-29 RX ADMIN — OXYCODONE HYDROCHLORIDE 10 MILLIGRAM(S): 30 TABLET ORAL at 22:14

## 2024-06-29 RX ADMIN — BUTALBITAL, ACETAMINOPHEN AND CAFFEINE 1 CAPSULE(S): 50; 325; 40 TABLET ORAL at 01:45

## 2024-06-29 RX ADMIN — NIMODIPINE 30 MILLIGRAM(S): 30 CAPSULE, LIQUID FILLED ORAL at 03:17

## 2024-06-29 RX ADMIN — OXYCODONE HYDROCHLORIDE 5 MILLIGRAM(S): 30 TABLET ORAL at 00:24

## 2024-06-29 RX ADMIN — Medication 1000 MILLIGRAM(S): at 16:00

## 2024-06-29 RX ADMIN — BUTALBITAL, ACETAMINOPHEN AND CAFFEINE 1 CAPSULE(S): 50; 325; 40 TABLET ORAL at 12:07

## 2024-06-29 RX ADMIN — LEVETIRACETAM 400 MILLIGRAM(S): 10 INJECTION, SOLUTION INTRAVENOUS at 17:06

## 2024-06-29 RX ADMIN — Medication 650 MILLIGRAM(S): at 03:40

## 2024-06-29 RX ADMIN — DEXAMETHASONE 4 MILLIGRAM(S): 0.5 TABLET ORAL at 05:31

## 2024-06-29 RX ADMIN — Medication 650 MILLIGRAM(S): at 04:00

## 2024-06-29 RX ADMIN — NIMODIPINE 30 MILLIGRAM(S): 30 CAPSULE, LIQUID FILLED ORAL at 11:57

## 2024-06-29 RX ADMIN — OXYCODONE HYDROCHLORIDE 5 MILLIGRAM(S): 30 TABLET ORAL at 01:00

## 2024-06-29 RX ADMIN — BUTALBITAL, ACETAMINOPHEN AND CAFFEINE 1 CAPSULE(S): 50; 325; 40 TABLET ORAL at 00:55

## 2024-06-29 RX ADMIN — OXYCODONE HYDROCHLORIDE 10 MILLIGRAM(S): 30 TABLET ORAL at 17:06

## 2024-06-29 RX ADMIN — NIMODIPINE 30 MILLIGRAM(S): 30 CAPSULE, LIQUID FILLED ORAL at 00:55

## 2024-06-29 RX ADMIN — LEVETIRACETAM 400 MILLIGRAM(S): 10 INJECTION, SOLUTION INTRAVENOUS at 05:31

## 2024-06-29 RX ADMIN — Medication 1 PACKET(S): at 09:04

## 2024-06-29 RX ADMIN — BUTALBITAL, ACETAMINOPHEN AND CAFFEINE 1 CAPSULE(S): 50; 325; 40 TABLET ORAL at 23:24

## 2024-06-29 RX ADMIN — Medication 3 GRAM(S): at 05:31

## 2024-06-29 RX ADMIN — SCOPOLAMINE 1 PATCH: 1 PATCH, EXTENDED RELEASE TRANSDERMAL at 07:04

## 2024-06-29 RX ADMIN — Medication 25 GRAM(S): at 09:04

## 2024-06-29 RX ADMIN — OXYCODONE HYDROCHLORIDE 10 MILLIGRAM(S): 30 TABLET ORAL at 04:00

## 2024-06-29 RX ADMIN — NIMODIPINE 30 MILLIGRAM(S): 30 CAPSULE, LIQUID FILLED ORAL at 21:36

## 2024-06-29 RX ADMIN — Medication 40 MILLIGRAM(S): at 11:57

## 2024-06-29 RX ADMIN — TRAMADOL HYDROCHLORIDE 50 MILLIGRAM(S): 50 TABLET, FILM COATED ORAL at 05:48

## 2024-06-29 RX ADMIN — Medication 3 GRAM(S): at 17:05

## 2024-06-29 RX ADMIN — Medication 650 MILLIGRAM(S): at 10:00

## 2024-06-29 RX ADMIN — OXYCODONE HYDROCHLORIDE 10 MILLIGRAM(S): 30 TABLET ORAL at 18:00

## 2024-06-29 RX ADMIN — TRAMADOL HYDROCHLORIDE 50 MILLIGRAM(S): 50 TABLET, FILM COATED ORAL at 06:30

## 2024-06-29 RX ADMIN — POLYETHYLENE GLYCOL 3350 17 GRAM(S): 17 POWDER, FOR SOLUTION ORAL at 17:04

## 2024-06-29 RX ADMIN — Medication 2 TABLET(S): at 21:36

## 2024-06-29 RX ADMIN — OXYCODONE HYDROCHLORIDE 10 MILLIGRAM(S): 30 TABLET ORAL at 03:20

## 2024-06-29 RX ADMIN — POLYETHYLENE GLYCOL 3350 17 GRAM(S): 17 POWDER, FOR SOLUTION ORAL at 21:36

## 2024-06-29 RX ADMIN — NIMODIPINE 30 MILLIGRAM(S): 30 CAPSULE, LIQUID FILLED ORAL at 16:26

## 2024-06-29 RX ADMIN — Medication 650 MILLIGRAM(S): at 21:26

## 2024-06-29 RX ADMIN — Medication 650 MILLIGRAM(S): at 09:04

## 2024-06-30 LAB
ANION GAP SERPL CALC-SCNC: 8 MMOL/L — SIGNIFICANT CHANGE UP (ref 5–17)
BUN SERPL-MCNC: 6 MG/DL — LOW (ref 7–23)
BUN SERPL-MCNC: 8 MG/DL — SIGNIFICANT CHANGE UP (ref 7–23)
BUN SERPL-MCNC: 9 MG/DL — SIGNIFICANT CHANGE UP (ref 7–23)
CALCIUM SERPL-MCNC: 8.4 MG/DL — SIGNIFICANT CHANGE UP (ref 8.4–10.5)
CALCIUM SERPL-MCNC: 8.7 MG/DL — SIGNIFICANT CHANGE UP (ref 8.4–10.5)
CALCIUM SERPL-MCNC: 8.7 MG/DL — SIGNIFICANT CHANGE UP (ref 8.4–10.5)
CHLORIDE SERPL-SCNC: 104 MMOL/L — SIGNIFICANT CHANGE UP (ref 96–108)
CHLORIDE SERPL-SCNC: 105 MMOL/L — SIGNIFICANT CHANGE UP (ref 96–108)
CHLORIDE SERPL-SCNC: 106 MMOL/L — SIGNIFICANT CHANGE UP (ref 96–108)
CO2 SERPL-SCNC: 23 MMOL/L — SIGNIFICANT CHANGE UP (ref 22–31)
CREAT SERPL-MCNC: 0.65 MG/DL — SIGNIFICANT CHANGE UP (ref 0.5–1.3)
CREAT SERPL-MCNC: 0.68 MG/DL — SIGNIFICANT CHANGE UP (ref 0.5–1.3)
CREAT SERPL-MCNC: 0.73 MG/DL — SIGNIFICANT CHANGE UP (ref 0.5–1.3)
EGFR: 104 ML/MIN/1.73M2 — SIGNIFICANT CHANGE UP
EGFR: 104 ML/MIN/1.73M2 — SIGNIFICANT CHANGE UP
EGFR: 110 ML/MIN/1.73M2 — SIGNIFICANT CHANGE UP
EGFR: 110 ML/MIN/1.73M2 — SIGNIFICANT CHANGE UP
EGFR: 111 ML/MIN/1.73M2 — SIGNIFICANT CHANGE UP
EGFR: 111 ML/MIN/1.73M2 — SIGNIFICANT CHANGE UP
GLUCOSE SERPL-MCNC: 108 MG/DL — HIGH (ref 70–99)
GLUCOSE SERPL-MCNC: 122 MG/DL — HIGH (ref 70–99)
GLUCOSE SERPL-MCNC: 95 MG/DL — SIGNIFICANT CHANGE UP (ref 70–99)
HCT VFR BLD CALC: 32.7 % — LOW (ref 34.5–45)
HGB BLD-MCNC: 11 G/DL — LOW (ref 11.5–15.5)
MAGNESIUM SERPL-MCNC: 1.9 MG/DL — SIGNIFICANT CHANGE UP (ref 1.6–2.6)
MCHC RBC-ENTMCNC: 28.4 PG — SIGNIFICANT CHANGE UP (ref 27–34)
MCHC RBC-ENTMCNC: 33.6 GM/DL — SIGNIFICANT CHANGE UP (ref 32–36)
MCV RBC AUTO: 84.5 FL — SIGNIFICANT CHANGE UP (ref 80–100)
NRBC # BLD: 0 /100 WBCS — SIGNIFICANT CHANGE UP (ref 0–0)
NRBC BLD-RTO: 0 /100 WBCS — SIGNIFICANT CHANGE UP (ref 0–0)
PHOSPHATE SERPL-MCNC: 2.9 MG/DL — SIGNIFICANT CHANGE UP (ref 2.5–4.5)
PLATELET # BLD AUTO: 175 K/UL — SIGNIFICANT CHANGE UP (ref 150–400)
POTASSIUM SERPL-MCNC: 3.6 MMOL/L — SIGNIFICANT CHANGE UP (ref 3.5–5.3)
POTASSIUM SERPL-MCNC: 4.2 MMOL/L — SIGNIFICANT CHANGE UP (ref 3.5–5.3)
POTASSIUM SERPL-MCNC: 4.4 MMOL/L — SIGNIFICANT CHANGE UP (ref 3.5–5.3)
POTASSIUM SERPL-SCNC: 3.6 MMOL/L — SIGNIFICANT CHANGE UP (ref 3.5–5.3)
POTASSIUM SERPL-SCNC: 4.2 MMOL/L — SIGNIFICANT CHANGE UP (ref 3.5–5.3)
POTASSIUM SERPL-SCNC: 4.4 MMOL/L — SIGNIFICANT CHANGE UP (ref 3.5–5.3)
RBC # BLD: 3.87 M/UL — SIGNIFICANT CHANGE UP (ref 3.8–5.2)
RBC # FLD: 16 % — HIGH (ref 10.3–14.5)
SODIUM SERPL-SCNC: 135 MMOL/L — SIGNIFICANT CHANGE UP (ref 135–145)
SODIUM SERPL-SCNC: 136 MMOL/L — SIGNIFICANT CHANGE UP (ref 135–145)
SODIUM SERPL-SCNC: 137 MMOL/L — SIGNIFICANT CHANGE UP (ref 135–145)
WBC # BLD: 12.1 K/UL — HIGH (ref 3.8–10.5)
WBC # FLD AUTO: 12.1 K/UL — HIGH (ref 3.8–10.5)

## 2024-06-30 PROCEDURE — 99291 CRITICAL CARE FIRST HOUR: CPT

## 2024-06-30 RX ORDER — TRAMADOL HYDROCHLORIDE 50 MG/1
50 TABLET, FILM COATED ORAL ONCE
Refills: 0 | Status: DISCONTINUED | OUTPATIENT
Start: 2024-06-30 | End: 2024-06-30

## 2024-06-30 RX ORDER — DEXAMETHASONE 0.5 MG/1
4 TABLET ORAL EVERY 6 HOURS
Refills: 0 | Status: COMPLETED | OUTPATIENT
Start: 2024-06-30 | End: 2024-07-01

## 2024-06-30 RX ORDER — HYDROMORPHONE/SOD CHLOR,ISO/PF 2 MG/10 ML
0.25 SYRINGE (ML) INJECTION ONCE
Refills: 0 | Status: DISCONTINUED | OUTPATIENT
Start: 2024-06-30 | End: 2024-06-30

## 2024-06-30 RX ADMIN — OXYCODONE HYDROCHLORIDE 10 MILLIGRAM(S): 30 TABLET ORAL at 22:00

## 2024-06-30 RX ADMIN — DEXAMETHASONE 2 MILLIGRAM(S): 0.5 TABLET ORAL at 06:03

## 2024-06-30 RX ADMIN — NIMODIPINE 30 MILLIGRAM(S): 30 CAPSULE, LIQUID FILLED ORAL at 22:53

## 2024-06-30 RX ADMIN — BUTALBITAL, ACETAMINOPHEN AND CAFFEINE 1 CAPSULE(S): 50; 325; 40 TABLET ORAL at 09:30

## 2024-06-30 RX ADMIN — BUTALBITAL, ACETAMINOPHEN AND CAFFEINE 1 CAPSULE(S): 50; 325; 40 TABLET ORAL at 00:01

## 2024-06-30 RX ADMIN — DEXAMETHASONE 4 MILLIGRAM(S): 0.5 TABLET ORAL at 21:12

## 2024-06-30 RX ADMIN — TRAMADOL HYDROCHLORIDE 50 MILLIGRAM(S): 50 TABLET, FILM COATED ORAL at 01:00

## 2024-06-30 RX ADMIN — Medication 0.25 MILLIGRAM(S): at 22:00

## 2024-06-30 RX ADMIN — NIMODIPINE 30 MILLIGRAM(S): 30 CAPSULE, LIQUID FILLED ORAL at 01:25

## 2024-06-30 RX ADMIN — NIMODIPINE 30 MILLIGRAM(S): 30 CAPSULE, LIQUID FILLED ORAL at 11:05

## 2024-06-30 RX ADMIN — Medication 3 GRAM(S): at 06:06

## 2024-06-30 RX ADMIN — Medication 650 MILLIGRAM(S): at 04:40

## 2024-06-30 RX ADMIN — SODIUM CHLORIDE 50 MILLILITER(S): 3 INJECTION, SOLUTION INTRAVENOUS at 11:06

## 2024-06-30 RX ADMIN — Medication 3 GRAM(S): at 21:12

## 2024-06-30 RX ADMIN — SODIUM CHLORIDE 50 MILLILITER(S): 3 INJECTION, SOLUTION INTRAVENOUS at 06:25

## 2024-06-30 RX ADMIN — SCOPOLAMINE 1 PATCH: 1 PATCH, EXTENDED RELEASE TRANSDERMAL at 06:29

## 2024-06-30 RX ADMIN — TRAMADOL HYDROCHLORIDE 50 MILLIGRAM(S): 50 TABLET, FILM COATED ORAL at 00:14

## 2024-06-30 RX ADMIN — Medication 10 MILLIGRAM(S): at 07:33

## 2024-06-30 RX ADMIN — Medication 650 MILLIGRAM(S): at 12:15

## 2024-06-30 RX ADMIN — LEVETIRACETAM 400 MILLIGRAM(S): 10 INJECTION, SOLUTION INTRAVENOUS at 06:04

## 2024-06-30 RX ADMIN — Medication 650 MILLIGRAM(S): at 13:10

## 2024-06-30 RX ADMIN — NIMODIPINE 30 MILLIGRAM(S): 30 CAPSULE, LIQUID FILLED ORAL at 07:01

## 2024-06-30 RX ADMIN — LEVETIRACETAM 400 MILLIGRAM(S): 10 INJECTION, SOLUTION INTRAVENOUS at 17:05

## 2024-06-30 RX ADMIN — Medication 3 GRAM(S): at 14:59

## 2024-06-30 RX ADMIN — NIMODIPINE 30 MILLIGRAM(S): 30 CAPSULE, LIQUID FILLED ORAL at 13:01

## 2024-06-30 RX ADMIN — DEXAMETHASONE 2 MILLIGRAM(S): 0.5 TABLET ORAL at 17:05

## 2024-06-30 RX ADMIN — SCOPOLAMINE 1 PATCH: 1 PATCH, EXTENDED RELEASE TRANSDERMAL at 19:36

## 2024-06-30 RX ADMIN — BUTALBITAL, ACETAMINOPHEN AND CAFFEINE 1 CAPSULE(S): 50; 325; 40 TABLET ORAL at 21:12

## 2024-06-30 RX ADMIN — SCOPOLAMINE 1 PATCH: 1 PATCH, EXTENDED RELEASE TRANSDERMAL at 19:35

## 2024-06-30 RX ADMIN — NIMODIPINE 30 MILLIGRAM(S): 30 CAPSULE, LIQUID FILLED ORAL at 14:58

## 2024-06-30 RX ADMIN — Medication 40 MILLIGRAM(S): at 11:06

## 2024-06-30 RX ADMIN — BUTALBITAL, ACETAMINOPHEN AND CAFFEINE 1 CAPSULE(S): 50; 325; 40 TABLET ORAL at 17:05

## 2024-06-30 RX ADMIN — Medication 10 MILLIGRAM(S): at 00:14

## 2024-06-30 RX ADMIN — POLYETHYLENE GLYCOL 3350 17 GRAM(S): 17 POWDER, FOR SOLUTION ORAL at 06:03

## 2024-06-30 RX ADMIN — NIMODIPINE 30 MILLIGRAM(S): 30 CAPSULE, LIQUID FILLED ORAL at 19:01

## 2024-06-30 RX ADMIN — Medication 10 MILLIGRAM(S): at 19:08

## 2024-06-30 RX ADMIN — Medication 40 MILLIEQUIVALENT(S): at 07:00

## 2024-06-30 RX ADMIN — NIMODIPINE 30 MILLIGRAM(S): 30 CAPSULE, LIQUID FILLED ORAL at 08:59

## 2024-06-30 RX ADMIN — NIMODIPINE 30 MILLIGRAM(S): 30 CAPSULE, LIQUID FILLED ORAL at 21:12

## 2024-06-30 RX ADMIN — NIMODIPINE 30 MILLIGRAM(S): 30 CAPSULE, LIQUID FILLED ORAL at 16:53

## 2024-06-30 RX ADMIN — OXYCODONE HYDROCHLORIDE 10 MILLIGRAM(S): 30 TABLET ORAL at 19:31

## 2024-06-30 RX ADMIN — BUTALBITAL, ACETAMINOPHEN AND CAFFEINE 1 CAPSULE(S): 50; 325; 40 TABLET ORAL at 18:00

## 2024-06-30 RX ADMIN — Medication 650 MILLIGRAM(S): at 04:10

## 2024-06-30 RX ADMIN — SODIUM CHLORIDE 50 MILLILITER(S): 3 INJECTION, SOLUTION INTRAVENOUS at 21:54

## 2024-06-30 RX ADMIN — Medication 0.25 MILLIGRAM(S): at 21:12

## 2024-06-30 RX ADMIN — BUTALBITAL, ACETAMINOPHEN AND CAFFEINE 1 CAPSULE(S): 50; 325; 40 TABLET ORAL at 08:31

## 2024-06-30 RX ADMIN — BUTALBITAL, ACETAMINOPHEN AND CAFFEINE 1 CAPSULE(S): 50; 325; 40 TABLET ORAL at 22:00

## 2024-06-30 RX ADMIN — Medication 1 APPLICATION(S): at 06:04

## 2024-07-01 LAB
ANION GAP SERPL CALC-SCNC: 10 MMOL/L — SIGNIFICANT CHANGE UP (ref 5–17)
ANION GAP SERPL CALC-SCNC: 11 MMOL/L — SIGNIFICANT CHANGE UP (ref 5–17)
ANION GAP SERPL CALC-SCNC: 13 MMOL/L — SIGNIFICANT CHANGE UP (ref 5–17)
ANION GAP SERPL CALC-SCNC: 8 MMOL/L — SIGNIFICANT CHANGE UP (ref 5–17)
APTT BLD: 25.1 SEC — SIGNIFICANT CHANGE UP (ref 24.5–35.6)
BLD GP AB SCN SERPL QL: NEGATIVE — SIGNIFICANT CHANGE UP
BUN SERPL-MCNC: 12 MG/DL — SIGNIFICANT CHANGE UP (ref 7–23)
BUN SERPL-MCNC: 7 MG/DL — SIGNIFICANT CHANGE UP (ref 7–23)
BUN SERPL-MCNC: 8 MG/DL — SIGNIFICANT CHANGE UP (ref 7–23)
BUN SERPL-MCNC: 9 MG/DL — SIGNIFICANT CHANGE UP (ref 7–23)
CALCIUM SERPL-MCNC: 8.6 MG/DL — SIGNIFICANT CHANGE UP (ref 8.4–10.5)
CALCIUM SERPL-MCNC: 8.7 MG/DL — SIGNIFICANT CHANGE UP (ref 8.4–10.5)
CALCIUM SERPL-MCNC: 8.9 MG/DL — SIGNIFICANT CHANGE UP (ref 8.4–10.5)
CALCIUM SERPL-MCNC: 9 MG/DL — SIGNIFICANT CHANGE UP (ref 8.4–10.5)
CHLORIDE SERPL-SCNC: 103 MMOL/L — SIGNIFICANT CHANGE UP (ref 96–108)
CHLORIDE SERPL-SCNC: 104 MMOL/L — SIGNIFICANT CHANGE UP (ref 96–108)
CHLORIDE SERPL-SCNC: 104 MMOL/L — SIGNIFICANT CHANGE UP (ref 96–108)
CHLORIDE SERPL-SCNC: 106 MMOL/L — SIGNIFICANT CHANGE UP (ref 96–108)
CO2 SERPL-SCNC: 18 MMOL/L — LOW (ref 22–31)
CO2 SERPL-SCNC: 22 MMOL/L — SIGNIFICANT CHANGE UP (ref 22–31)
CREAT SERPL-MCNC: 0.64 MG/DL — SIGNIFICANT CHANGE UP (ref 0.5–1.3)
CREAT SERPL-MCNC: 0.7 MG/DL — SIGNIFICANT CHANGE UP (ref 0.5–1.3)
EGFR: 109 ML/MIN/1.73M2 — SIGNIFICANT CHANGE UP
EGFR: 109 ML/MIN/1.73M2 — SIGNIFICANT CHANGE UP
EGFR: 112 ML/MIN/1.73M2 — SIGNIFICANT CHANGE UP
GLUCOSE SERPL-MCNC: 117 MG/DL — HIGH (ref 70–99)
GLUCOSE SERPL-MCNC: 125 MG/DL — HIGH (ref 70–99)
GLUCOSE SERPL-MCNC: 126 MG/DL — HIGH (ref 70–99)
GLUCOSE SERPL-MCNC: 132 MG/DL — HIGH (ref 70–99)
HCG SERPL-ACNC: <1 MIU/ML — SIGNIFICANT CHANGE UP
HCT VFR BLD CALC: 32.9 % — LOW (ref 34.5–45)
HGB BLD-MCNC: 11.1 G/DL — LOW (ref 11.5–15.5)
INR BLD: 0.95 — SIGNIFICANT CHANGE UP (ref 0.85–1.18)
MAGNESIUM SERPL-MCNC: 1.7 MG/DL — SIGNIFICANT CHANGE UP (ref 1.6–2.6)
MCHC RBC-ENTMCNC: 28.2 PG — SIGNIFICANT CHANGE UP (ref 27–34)
MCHC RBC-ENTMCNC: 33.7 GM/DL — SIGNIFICANT CHANGE UP (ref 32–36)
MCV RBC AUTO: 83.5 FL — SIGNIFICANT CHANGE UP (ref 80–100)
NRBC # BLD: 0 /100 WBCS — SIGNIFICANT CHANGE UP (ref 0–0)
NRBC BLD-RTO: 0 /100 WBCS — SIGNIFICANT CHANGE UP (ref 0–0)
PHOSPHATE SERPL-MCNC: 3 MG/DL — SIGNIFICANT CHANGE UP (ref 2.5–4.5)
PLATELET # BLD AUTO: 169 K/UL — SIGNIFICANT CHANGE UP (ref 150–400)
POTASSIUM SERPL-MCNC: 3.8 MMOL/L — SIGNIFICANT CHANGE UP (ref 3.5–5.3)
POTASSIUM SERPL-MCNC: 4.1 MMOL/L — SIGNIFICANT CHANGE UP (ref 3.5–5.3)
POTASSIUM SERPL-MCNC: 4.1 MMOL/L — SIGNIFICANT CHANGE UP (ref 3.5–5.3)
POTASSIUM SERPL-MCNC: 4.5 MMOL/L — SIGNIFICANT CHANGE UP (ref 3.5–5.3)
POTASSIUM SERPL-SCNC: 3.8 MMOL/L — SIGNIFICANT CHANGE UP (ref 3.5–5.3)
POTASSIUM SERPL-SCNC: 4.1 MMOL/L — SIGNIFICANT CHANGE UP (ref 3.5–5.3)
POTASSIUM SERPL-SCNC: 4.1 MMOL/L — SIGNIFICANT CHANGE UP (ref 3.5–5.3)
POTASSIUM SERPL-SCNC: 4.5 MMOL/L — SIGNIFICANT CHANGE UP (ref 3.5–5.3)
PROTHROM AB SERPL-ACNC: 10.9 SEC — SIGNIFICANT CHANGE UP (ref 9.5–13)
RBC # BLD: 3.94 M/UL — SIGNIFICANT CHANGE UP (ref 3.8–5.2)
RBC # FLD: 16 % — HIGH (ref 10.3–14.5)
RH IG SCN BLD-IMP: POSITIVE — SIGNIFICANT CHANGE UP
SODIUM SERPL-SCNC: 134 MMOL/L — LOW (ref 135–145)
SODIUM SERPL-SCNC: 134 MMOL/L — LOW (ref 135–145)
SODIUM SERPL-SCNC: 136 MMOL/L — SIGNIFICANT CHANGE UP (ref 135–145)
SODIUM SERPL-SCNC: 139 MMOL/L — SIGNIFICANT CHANGE UP (ref 135–145)
WBC # BLD: 11.17 K/UL — HIGH (ref 3.8–10.5)
WBC # FLD AUTO: 11.17 K/UL — HIGH (ref 3.8–10.5)

## 2024-07-01 PROCEDURE — 99291 CRITICAL CARE FIRST HOUR: CPT

## 2024-07-01 PROCEDURE — 99292 CRITICAL CARE ADDL 30 MIN: CPT

## 2024-07-01 RX ORDER — NIMODIPINE 30 MG/1
60 CAPSULE, LIQUID FILLED ORAL EVERY 4 HOURS
Refills: 0 | Status: DISCONTINUED | OUTPATIENT
Start: 2024-07-01 | End: 2024-07-06

## 2024-07-01 RX ORDER — METOCLOPRAMIDE HCL 10 MG
5 TABLET ORAL EVERY 4 HOURS
Refills: 0 | Status: DISCONTINUED | OUTPATIENT
Start: 2024-07-01 | End: 2024-07-01

## 2024-07-01 RX ORDER — FLUDROCORTISONE ACETATE 0.1 MG
0.1 TABLET ORAL EVERY 12 HOURS
Refills: 0 | Status: DISCONTINUED | OUTPATIENT
Start: 2024-07-01 | End: 2024-07-04

## 2024-07-01 RX ORDER — SODIUM CHLORIDE 3 G/100ML
500 INJECTION, SOLUTION INTRAVENOUS
Refills: 0 | Status: DISCONTINUED | OUTPATIENT
Start: 2024-07-01 | End: 2024-07-03

## 2024-07-01 RX ORDER — SODIUM CHLORIDE 3 G/100ML
500 INJECTION, SOLUTION INTRAVENOUS
Refills: 0 | Status: DISCONTINUED | OUTPATIENT
Start: 2024-07-01 | End: 2024-07-01

## 2024-07-01 RX ORDER — MAGNESIUM SULFATE 500 MG/ML
2 SYRINGE (ML) INJECTION ONCE
Refills: 0 | Status: COMPLETED | OUTPATIENT
Start: 2024-07-01 | End: 2024-07-01

## 2024-07-01 RX ORDER — SENNA 187 MG
2 TABLET ORAL AT BEDTIME
Refills: 0 | Status: DISCONTINUED | OUTPATIENT
Start: 2024-07-01 | End: 2024-07-06

## 2024-07-01 RX ORDER — METOCLOPRAMIDE HCL 10 MG
10 TABLET ORAL EVERY 6 HOURS
Refills: 0 | Status: DISCONTINUED | OUTPATIENT
Start: 2024-07-01 | End: 2024-07-04

## 2024-07-01 RX ADMIN — Medication 10 MILLIGRAM(S): at 23:11

## 2024-07-01 RX ADMIN — Medication 3 GRAM(S): at 05:03

## 2024-07-01 RX ADMIN — NIMODIPINE 30 MILLIGRAM(S): 30 CAPSULE, LIQUID FILLED ORAL at 12:07

## 2024-07-01 RX ADMIN — OXYCODONE HYDROCHLORIDE 5 MILLIGRAM(S): 30 TABLET ORAL at 21:00

## 2024-07-01 RX ADMIN — BUTALBITAL, ACETAMINOPHEN AND CAFFEINE 1 CAPSULE(S): 50; 325; 40 TABLET ORAL at 15:43

## 2024-07-01 RX ADMIN — OXYCODONE HYDROCHLORIDE 10 MILLIGRAM(S): 30 TABLET ORAL at 01:05

## 2024-07-01 RX ADMIN — BUTALBITAL, ACETAMINOPHEN AND CAFFEINE 1 CAPSULE(S): 50; 325; 40 TABLET ORAL at 09:36

## 2024-07-01 RX ADMIN — BUTALBITAL, ACETAMINOPHEN AND CAFFEINE 1 CAPSULE(S): 50; 325; 40 TABLET ORAL at 19:25

## 2024-07-01 RX ADMIN — LEVETIRACETAM 400 MILLIGRAM(S): 10 INJECTION, SOLUTION INTRAVENOUS at 05:03

## 2024-07-01 RX ADMIN — Medication 3 GRAM(S): at 19:49

## 2024-07-01 RX ADMIN — Medication 0.1 MILLIGRAM(S): at 19:50

## 2024-07-01 RX ADMIN — NIMODIPINE 30 MILLIGRAM(S): 30 CAPSULE, LIQUID FILLED ORAL at 06:51

## 2024-07-01 RX ADMIN — NIMODIPINE 30 MILLIGRAM(S): 30 CAPSULE, LIQUID FILLED ORAL at 16:11

## 2024-07-01 RX ADMIN — BUTALBITAL, ACETAMINOPHEN AND CAFFEINE 1 CAPSULE(S): 50; 325; 40 TABLET ORAL at 22:30

## 2024-07-01 RX ADMIN — DEXAMETHASONE 4 MILLIGRAM(S): 0.5 TABLET ORAL at 03:05

## 2024-07-01 RX ADMIN — BUTALBITAL, ACETAMINOPHEN AND CAFFEINE 1 CAPSULE(S): 50; 325; 40 TABLET ORAL at 19:11

## 2024-07-01 RX ADMIN — SODIUM CHLORIDE 60 MILLILITER(S): 3 INJECTION, SOLUTION INTRAVENOUS at 15:02

## 2024-07-01 RX ADMIN — Medication 1000 MILLILITER(S): at 09:36

## 2024-07-01 RX ADMIN — Medication 3 GRAM(S): at 12:07

## 2024-07-01 RX ADMIN — BUTALBITAL, ACETAMINOPHEN AND CAFFEINE 1 CAPSULE(S): 50; 325; 40 TABLET ORAL at 10:00

## 2024-07-01 RX ADMIN — Medication 1 APPLICATION(S): at 05:27

## 2024-07-01 RX ADMIN — OXYCODONE HYDROCHLORIDE 5 MILLIGRAM(S): 30 TABLET ORAL at 20:40

## 2024-07-01 RX ADMIN — Medication 10 MILLIGRAM(S): at 12:07

## 2024-07-01 RX ADMIN — DEXAMETHASONE 4 MILLIGRAM(S): 0.5 TABLET ORAL at 09:14

## 2024-07-01 RX ADMIN — Medication 40 MILLIEQUIVALENT(S): at 15:00

## 2024-07-01 RX ADMIN — Medication 10 MILLIGRAM(S): at 19:47

## 2024-07-01 RX ADMIN — Medication 10 MILLIGRAM(S): at 19:22

## 2024-07-01 RX ADMIN — Medication 10 MILLIGRAM(S): at 16:10

## 2024-07-01 RX ADMIN — BUTALBITAL, ACETAMINOPHEN AND CAFFEINE 1 CAPSULE(S): 50; 325; 40 TABLET ORAL at 22:01

## 2024-07-01 RX ADMIN — NIMODIPINE 30 MILLIGRAM(S): 30 CAPSULE, LIQUID FILLED ORAL at 09:14

## 2024-07-01 RX ADMIN — NIMODIPINE 30 MILLIGRAM(S): 30 CAPSULE, LIQUID FILLED ORAL at 01:06

## 2024-07-01 RX ADMIN — OXYCODONE HYDROCHLORIDE 10 MILLIGRAM(S): 30 TABLET ORAL at 02:06

## 2024-07-01 RX ADMIN — DEXAMETHASONE 4 MILLIGRAM(S): 0.5 TABLET ORAL at 15:00

## 2024-07-01 RX ADMIN — Medication 25 GRAM(S): at 06:51

## 2024-07-01 RX ADMIN — NIMODIPINE 30 MILLIGRAM(S): 30 CAPSULE, LIQUID FILLED ORAL at 15:00

## 2024-07-01 RX ADMIN — NIMODIPINE 30 MILLIGRAM(S): 30 CAPSULE, LIQUID FILLED ORAL at 03:05

## 2024-07-01 RX ADMIN — NIMODIPINE 30 MILLIGRAM(S): 30 CAPSULE, LIQUID FILLED ORAL at 05:03

## 2024-07-01 RX ADMIN — NIMODIPINE 60 MILLIGRAM(S): 30 CAPSULE, LIQUID FILLED ORAL at 22:01

## 2024-07-01 RX ADMIN — Medication 55 MILLIGRAM(S): at 14:59

## 2024-07-01 RX ADMIN — NIMODIPINE 60 MILLIGRAM(S): 30 CAPSULE, LIQUID FILLED ORAL at 19:49

## 2024-07-01 RX ADMIN — Medication 55 MILLIGRAM(S): at 22:01

## 2024-07-01 RX ADMIN — BUTALBITAL, ACETAMINOPHEN AND CAFFEINE 1 CAPSULE(S): 50; 325; 40 TABLET ORAL at 15:00

## 2024-07-02 LAB
ANION GAP SERPL CALC-SCNC: 10 MMOL/L — SIGNIFICANT CHANGE UP (ref 5–17)
ANION GAP SERPL CALC-SCNC: 11 MMOL/L — SIGNIFICANT CHANGE UP (ref 5–17)
ANION GAP SERPL CALC-SCNC: 11 MMOL/L — SIGNIFICANT CHANGE UP (ref 5–17)
ANION GAP SERPL CALC-SCNC: 9 MMOL/L — SIGNIFICANT CHANGE UP (ref 5–17)
ANION GAP SERPL CALC-SCNC: 9 MMOL/L — SIGNIFICANT CHANGE UP (ref 5–17)
APTT BLD: 69.2 SEC — HIGH (ref 24.5–35.6)
BASOPHILS # BLD AUTO: 0.02 K/UL — SIGNIFICANT CHANGE UP (ref 0–0.2)
BASOPHILS NFR BLD AUTO: 0.1 % — SIGNIFICANT CHANGE UP (ref 0–2)
BUN SERPL-MCNC: 6 MG/DL — LOW (ref 7–23)
BUN SERPL-MCNC: 7 MG/DL — SIGNIFICANT CHANGE UP (ref 7–23)
BUN SERPL-MCNC: 9 MG/DL — SIGNIFICANT CHANGE UP (ref 7–23)
CALCIUM SERPL-MCNC: 8.1 MG/DL — LOW (ref 8.4–10.5)
CALCIUM SERPL-MCNC: 8.4 MG/DL — SIGNIFICANT CHANGE UP (ref 8.4–10.5)
CALCIUM SERPL-MCNC: 8.8 MG/DL — SIGNIFICANT CHANGE UP (ref 8.4–10.5)
CALCIUM SERPL-MCNC: 8.8 MG/DL — SIGNIFICANT CHANGE UP (ref 8.4–10.5)
CALCIUM SERPL-MCNC: 8.9 MG/DL — SIGNIFICANT CHANGE UP (ref 8.4–10.5)
CHLORIDE SERPL-SCNC: 103 MMOL/L — SIGNIFICANT CHANGE UP (ref 96–108)
CHLORIDE SERPL-SCNC: 105 MMOL/L — SIGNIFICANT CHANGE UP (ref 96–108)
CHLORIDE SERPL-SCNC: 105 MMOL/L — SIGNIFICANT CHANGE UP (ref 96–108)
CHLORIDE SERPL-SCNC: 106 MMOL/L — SIGNIFICANT CHANGE UP (ref 96–108)
CHLORIDE SERPL-SCNC: 110 MMOL/L — HIGH (ref 96–108)
CO2 SERPL-SCNC: 20 MMOL/L — LOW (ref 22–31)
CO2 SERPL-SCNC: 20 MMOL/L — LOW (ref 22–31)
CO2 SERPL-SCNC: 21 MMOL/L — LOW (ref 22–31)
CO2 SERPL-SCNC: 21 MMOL/L — LOW (ref 22–31)
CO2 SERPL-SCNC: 22 MMOL/L — SIGNIFICANT CHANGE UP (ref 22–31)
CREAT SERPL-MCNC: 0.6 MG/DL — SIGNIFICANT CHANGE UP (ref 0.5–1.3)
CREAT SERPL-MCNC: 0.64 MG/DL — SIGNIFICANT CHANGE UP (ref 0.5–1.3)
CREAT SERPL-MCNC: 0.66 MG/DL — SIGNIFICANT CHANGE UP (ref 0.5–1.3)
EGFR: 111 ML/MIN/1.73M2 — SIGNIFICANT CHANGE UP
EGFR: 112 ML/MIN/1.73M2 — SIGNIFICANT CHANGE UP
EGFR: 112 ML/MIN/1.73M2 — SIGNIFICANT CHANGE UP
EGFR: 113 ML/MIN/1.73M2 — SIGNIFICANT CHANGE UP
EGFR: 113 ML/MIN/1.73M2 — SIGNIFICANT CHANGE UP
EOSINOPHIL # BLD AUTO: 0.12 K/UL — SIGNIFICANT CHANGE UP (ref 0–0.5)
EOSINOPHIL NFR BLD AUTO: 0.8 % — SIGNIFICANT CHANGE UP (ref 0–6)
GLUCOSE SERPL-MCNC: 103 MG/DL — HIGH (ref 70–99)
GLUCOSE SERPL-MCNC: 105 MG/DL — HIGH (ref 70–99)
GLUCOSE SERPL-MCNC: 109 MG/DL — HIGH (ref 70–99)
GLUCOSE SERPL-MCNC: 112 MG/DL — HIGH (ref 70–99)
GLUCOSE SERPL-MCNC: 150 MG/DL — HIGH (ref 70–99)
HCT VFR BLD CALC: 32.2 % — LOW (ref 34.5–45)
HCT VFR BLD CALC: 37.5 % — SIGNIFICANT CHANGE UP (ref 34.5–45)
HGB BLD-MCNC: 11.1 G/DL — LOW (ref 11.5–15.5)
HGB BLD-MCNC: 12.2 G/DL — SIGNIFICANT CHANGE UP (ref 11.5–15.5)
IMM GRANULOCYTES NFR BLD AUTO: 0.4 % — SIGNIFICANT CHANGE UP (ref 0–0.9)
INR BLD: 1 — SIGNIFICANT CHANGE UP (ref 0.85–1.18)
LYMPHOCYTES # BLD AUTO: 21 % — SIGNIFICANT CHANGE UP (ref 13–44)
LYMPHOCYTES # BLD AUTO: 3.01 K/UL — SIGNIFICANT CHANGE UP (ref 1–3.3)
MAGNESIUM SERPL-MCNC: 1.6 MG/DL — SIGNIFICANT CHANGE UP (ref 1.6–2.6)
MAGNESIUM SERPL-MCNC: 2.6 MG/DL — SIGNIFICANT CHANGE UP (ref 1.6–2.6)
MCHC RBC-ENTMCNC: 28.3 PG — SIGNIFICANT CHANGE UP (ref 27–34)
MCHC RBC-ENTMCNC: 28.6 PG — SIGNIFICANT CHANGE UP (ref 27–34)
MCHC RBC-ENTMCNC: 32.5 GM/DL — SIGNIFICANT CHANGE UP (ref 32–36)
MCHC RBC-ENTMCNC: 34.5 GM/DL — SIGNIFICANT CHANGE UP (ref 32–36)
MCV RBC AUTO: 83 FL — SIGNIFICANT CHANGE UP (ref 80–100)
MCV RBC AUTO: 87 FL — SIGNIFICANT CHANGE UP (ref 80–100)
MONOCYTES # BLD AUTO: 1.39 K/UL — HIGH (ref 0–0.9)
MONOCYTES NFR BLD AUTO: 9.7 % — SIGNIFICANT CHANGE UP (ref 2–14)
NEUTROPHILS # BLD AUTO: 9.74 K/UL — HIGH (ref 1.8–7.4)
NEUTROPHILS NFR BLD AUTO: 68 % — SIGNIFICANT CHANGE UP (ref 43–77)
NRBC # BLD: 0 /100 WBCS — SIGNIFICANT CHANGE UP (ref 0–0)
NRBC # BLD: 0 /100 WBCS — SIGNIFICANT CHANGE UP (ref 0–0)
NRBC BLD-RTO: 0 /100 WBCS — SIGNIFICANT CHANGE UP (ref 0–0)
NRBC BLD-RTO: 0 /100 WBCS — SIGNIFICANT CHANGE UP (ref 0–0)
PHOSPHATE SERPL-MCNC: 2.7 MG/DL — SIGNIFICANT CHANGE UP (ref 2.5–4.5)
PHOSPHATE SERPL-MCNC: 3.1 MG/DL — SIGNIFICANT CHANGE UP (ref 2.5–4.5)
PLATELET # BLD AUTO: 165 K/UL — SIGNIFICANT CHANGE UP (ref 150–400)
PLATELET # BLD AUTO: 183 K/UL — SIGNIFICANT CHANGE UP (ref 150–400)
POTASSIUM SERPL-MCNC: 3.4 MMOL/L — LOW (ref 3.5–5.3)
POTASSIUM SERPL-MCNC: 3.4 MMOL/L — LOW (ref 3.5–5.3)
POTASSIUM SERPL-MCNC: 3.5 MMOL/L — SIGNIFICANT CHANGE UP (ref 3.5–5.3)
POTASSIUM SERPL-MCNC: 3.7 MMOL/L — SIGNIFICANT CHANGE UP (ref 3.5–5.3)
POTASSIUM SERPL-MCNC: 3.9 MMOL/L — SIGNIFICANT CHANGE UP (ref 3.5–5.3)
POTASSIUM SERPL-SCNC: 3.4 MMOL/L — LOW (ref 3.5–5.3)
POTASSIUM SERPL-SCNC: 3.4 MMOL/L — LOW (ref 3.5–5.3)
POTASSIUM SERPL-SCNC: 3.5 MMOL/L — SIGNIFICANT CHANGE UP (ref 3.5–5.3)
POTASSIUM SERPL-SCNC: 3.7 MMOL/L — SIGNIFICANT CHANGE UP (ref 3.5–5.3)
POTASSIUM SERPL-SCNC: 3.9 MMOL/L — SIGNIFICANT CHANGE UP (ref 3.5–5.3)
PROTHROM AB SERPL-ACNC: 11.4 SEC — SIGNIFICANT CHANGE UP (ref 9.5–13)
RBC # BLD: 3.88 M/UL — SIGNIFICANT CHANGE UP (ref 3.8–5.2)
RBC # BLD: 4.31 M/UL — SIGNIFICANT CHANGE UP (ref 3.8–5.2)
RBC # FLD: 15.9 % — HIGH (ref 10.3–14.5)
RBC # FLD: 16.1 % — HIGH (ref 10.3–14.5)
SODIUM SERPL-SCNC: 134 MMOL/L — LOW (ref 135–145)
SODIUM SERPL-SCNC: 135 MMOL/L — SIGNIFICANT CHANGE UP (ref 135–145)
SODIUM SERPL-SCNC: 137 MMOL/L — SIGNIFICANT CHANGE UP (ref 135–145)
SODIUM SERPL-SCNC: 137 MMOL/L — SIGNIFICANT CHANGE UP (ref 135–145)
SODIUM SERPL-SCNC: 140 MMOL/L — SIGNIFICANT CHANGE UP (ref 135–145)
WBC # BLD: 14.34 K/UL — HIGH (ref 3.8–10.5)
WBC # BLD: 16.88 K/UL — HIGH (ref 3.8–10.5)
WBC # FLD AUTO: 14.34 K/UL — HIGH (ref 3.8–10.5)
WBC # FLD AUTO: 16.88 K/UL — HIGH (ref 3.8–10.5)

## 2024-07-02 PROCEDURE — 99291 CRITICAL CARE FIRST HOUR: CPT

## 2024-07-02 RX ORDER — ONDANSETRON HCL/PF 4 MG/2 ML
4 VIAL (ML) INJECTION ONCE
Refills: 0 | Status: COMPLETED | OUTPATIENT
Start: 2024-07-02 | End: 2024-07-02

## 2024-07-02 RX ORDER — OXYCODONE HYDROCHLORIDE 30 MG/1
5 TABLET ORAL EVERY 4 HOURS
Refills: 0 | Status: DISCONTINUED | OUTPATIENT
Start: 2024-07-02 | End: 2024-07-06

## 2024-07-02 RX ORDER — MAGNESIUM SULFATE 500 MG/ML
4 SYRINGE (ML) INJECTION ONCE
Refills: 0 | Status: COMPLETED | OUTPATIENT
Start: 2024-07-02 | End: 2024-07-02

## 2024-07-02 RX ORDER — OXYCODONE HYDROCHLORIDE 30 MG/1
10 TABLET ORAL EVERY 4 HOURS
Refills: 0 | Status: DISCONTINUED | OUTPATIENT
Start: 2024-07-02 | End: 2024-07-06

## 2024-07-02 RX ORDER — MAGNESIUM, ALUMINUM HYDROXIDE 200-200 MG
30 TABLET,CHEWABLE ORAL EVERY 4 HOURS
Refills: 0 | Status: DISCONTINUED | OUTPATIENT
Start: 2024-07-02 | End: 2024-07-06

## 2024-07-02 RX ORDER — HYDROMORPHONE/SOD CHLOR,ISO/PF 2 MG/10 ML
0.25 SYRINGE (ML) INJECTION ONCE
Refills: 0 | Status: DISCONTINUED | OUTPATIENT
Start: 2024-07-02 | End: 2024-07-02

## 2024-07-02 RX ORDER — ACETAMINOPHEN 500 MG/5ML
1000 LIQUID (ML) ORAL ONCE
Refills: 0 | Status: COMPLETED | OUTPATIENT
Start: 2024-07-02 | End: 2024-07-02

## 2024-07-02 RX ADMIN — BUTALBITAL, ACETAMINOPHEN AND CAFFEINE 1 CAPSULE(S): 50; 325; 40 TABLET ORAL at 22:50

## 2024-07-02 RX ADMIN — Medication 10 MILLIGRAM(S): at 00:08

## 2024-07-02 RX ADMIN — Medication 10 MILLIGRAM(S): at 12:45

## 2024-07-02 RX ADMIN — Medication 1 APPLICATION(S): at 06:48

## 2024-07-02 RX ADMIN — Medication 0.25 MILLIGRAM(S): at 02:20

## 2024-07-02 RX ADMIN — NIMODIPINE 60 MILLIGRAM(S): 30 CAPSULE, LIQUID FILLED ORAL at 18:31

## 2024-07-02 RX ADMIN — Medication 40 MILLIEQUIVALENT(S): at 21:20

## 2024-07-02 RX ADMIN — Medication 55 MILLIGRAM(S): at 13:39

## 2024-07-02 RX ADMIN — Medication 0.25 MILLIGRAM(S): at 02:35

## 2024-07-02 RX ADMIN — NIMODIPINE 60 MILLIGRAM(S): 30 CAPSULE, LIQUID FILLED ORAL at 22:51

## 2024-07-02 RX ADMIN — Medication 30 MILLILITER(S): at 09:38

## 2024-07-02 RX ADMIN — BUTALBITAL, ACETAMINOPHEN AND CAFFEINE 1 CAPSULE(S): 50; 325; 40 TABLET ORAL at 18:30

## 2024-07-02 RX ADMIN — Medication 3 GRAM(S): at 00:08

## 2024-07-02 RX ADMIN — BUTALBITAL, ACETAMINOPHEN AND CAFFEINE 1 CAPSULE(S): 50; 325; 40 TABLET ORAL at 13:38

## 2024-07-02 RX ADMIN — OXYCODONE HYDROCHLORIDE 10 MILLIGRAM(S): 30 TABLET ORAL at 22:49

## 2024-07-02 RX ADMIN — BUTALBITAL, ACETAMINOPHEN AND CAFFEINE 1 CAPSULE(S): 50; 325; 40 TABLET ORAL at 14:38

## 2024-07-02 RX ADMIN — OXYCODONE HYDROCHLORIDE 10 MILLIGRAM(S): 30 TABLET ORAL at 23:25

## 2024-07-02 RX ADMIN — NIMODIPINE 60 MILLIGRAM(S): 30 CAPSULE, LIQUID FILLED ORAL at 13:25

## 2024-07-02 RX ADMIN — Medication 10 MILLIGRAM(S): at 18:12

## 2024-07-02 RX ADMIN — BUTALBITAL, ACETAMINOPHEN AND CAFFEINE 1 CAPSULE(S): 50; 325; 40 TABLET ORAL at 23:24

## 2024-07-02 RX ADMIN — BUTALBITAL, ACETAMINOPHEN AND CAFFEINE 1 CAPSULE(S): 50; 325; 40 TABLET ORAL at 19:30

## 2024-07-02 RX ADMIN — SODIUM CHLORIDE 75 MILLILITER(S): 3 INJECTION, SOLUTION INTRAVENOUS at 15:54

## 2024-07-02 RX ADMIN — Medication 1000 MILLIGRAM(S): at 06:05

## 2024-07-02 RX ADMIN — Medication 10 MILLIGRAM(S): at 06:07

## 2024-07-02 RX ADMIN — Medication 55 MILLIGRAM(S): at 06:07

## 2024-07-02 RX ADMIN — Medication 400 MILLIGRAM(S): at 05:31

## 2024-07-02 RX ADMIN — Medication 40 MILLIEQUIVALENT(S): at 07:05

## 2024-07-02 RX ADMIN — Medication 3 GRAM(S): at 18:32

## 2024-07-02 RX ADMIN — OXYCODONE HYDROCHLORIDE 10 MILLIGRAM(S): 30 TABLET ORAL at 01:00

## 2024-07-02 RX ADMIN — Medication 40 MILLIGRAM(S): at 05:31

## 2024-07-02 RX ADMIN — Medication 4 MILLIGRAM(S): at 01:51

## 2024-07-02 RX ADMIN — OXYCODONE HYDROCHLORIDE 10 MILLIGRAM(S): 30 TABLET ORAL at 00:12

## 2024-07-02 RX ADMIN — Medication 4 MILLIGRAM(S): at 08:30

## 2024-07-02 RX ADMIN — Medication 10 MILLIGRAM(S): at 13:25

## 2024-07-02 RX ADMIN — Medication 3 GRAM(S): at 13:24

## 2024-07-02 RX ADMIN — Medication 0.1 MILLIGRAM(S): at 18:32

## 2024-07-02 RX ADMIN — Medication 25 GRAM(S): at 07:05

## 2024-07-02 RX ADMIN — NIMODIPINE 60 MILLIGRAM(S): 30 CAPSULE, LIQUID FILLED ORAL at 08:20

## 2024-07-03 LAB
ANION GAP SERPL CALC-SCNC: 10 MMOL/L — SIGNIFICANT CHANGE UP (ref 5–17)
ANION GAP SERPL CALC-SCNC: 6 MMOL/L — SIGNIFICANT CHANGE UP (ref 5–17)
ANION GAP SERPL CALC-SCNC: 7 MMOL/L — SIGNIFICANT CHANGE UP (ref 5–17)
ANION GAP SERPL CALC-SCNC: 8 MMOL/L — SIGNIFICANT CHANGE UP (ref 5–17)
BUN SERPL-MCNC: 5 MG/DL — LOW (ref 7–23)
BUN SERPL-MCNC: 6 MG/DL — LOW (ref 7–23)
BUN SERPL-MCNC: 6 MG/DL — LOW (ref 7–23)
BUN SERPL-MCNC: 7 MG/DL — SIGNIFICANT CHANGE UP (ref 7–23)
CALCIUM SERPL-MCNC: 7.8 MG/DL — LOW (ref 8.4–10.5)
CALCIUM SERPL-MCNC: 8 MG/DL — LOW (ref 8.4–10.5)
CALCIUM SERPL-MCNC: 8.1 MG/DL — LOW (ref 8.4–10.5)
CALCIUM SERPL-MCNC: 8.4 MG/DL — SIGNIFICANT CHANGE UP (ref 8.4–10.5)
CHLORIDE SERPL-SCNC: 108 MMOL/L — SIGNIFICANT CHANGE UP (ref 96–108)
CHLORIDE SERPL-SCNC: 109 MMOL/L — HIGH (ref 96–108)
CHLORIDE SERPL-SCNC: 109 MMOL/L — HIGH (ref 96–108)
CHLORIDE SERPL-SCNC: 112 MMOL/L — HIGH (ref 96–108)
CO2 SERPL-SCNC: 18 MMOL/L — LOW (ref 22–31)
CO2 SERPL-SCNC: 19 MMOL/L — LOW (ref 22–31)
CO2 SERPL-SCNC: 21 MMOL/L — LOW (ref 22–31)
CO2 SERPL-SCNC: 21 MMOL/L — LOW (ref 22–31)
CREAT SERPL-MCNC: 0.62 MG/DL — SIGNIFICANT CHANGE UP (ref 0.5–1.3)
CREAT SERPL-MCNC: 0.67 MG/DL — SIGNIFICANT CHANGE UP (ref 0.5–1.3)
CREAT SERPL-MCNC: 0.69 MG/DL — SIGNIFICANT CHANGE UP (ref 0.5–1.3)
CREAT SERPL-MCNC: 0.74 MG/DL — SIGNIFICANT CHANGE UP (ref 0.5–1.3)
EGFR: 102 ML/MIN/1.73M2 — SIGNIFICANT CHANGE UP
EGFR: 102 ML/MIN/1.73M2 — SIGNIFICANT CHANGE UP
EGFR: 110 ML/MIN/1.73M2 — SIGNIFICANT CHANGE UP
EGFR: 113 ML/MIN/1.73M2 — SIGNIFICANT CHANGE UP
EGFR: 113 ML/MIN/1.73M2 — SIGNIFICANT CHANGE UP
GLUCOSE SERPL-MCNC: 104 MG/DL — HIGH (ref 70–99)
GLUCOSE SERPL-MCNC: 140 MG/DL — HIGH (ref 70–99)
GLUCOSE SERPL-MCNC: 89 MG/DL — SIGNIFICANT CHANGE UP (ref 70–99)
GLUCOSE SERPL-MCNC: 96 MG/DL — SIGNIFICANT CHANGE UP (ref 70–99)
HCT VFR BLD CALC: 30.5 % — LOW (ref 34.5–45)
HGB BLD-MCNC: 9.8 G/DL — LOW (ref 11.5–15.5)
MAGNESIUM SERPL-MCNC: 2 MG/DL — SIGNIFICANT CHANGE UP (ref 1.6–2.6)
MCHC RBC-ENTMCNC: 28.1 PG — SIGNIFICANT CHANGE UP (ref 27–34)
MCHC RBC-ENTMCNC: 32.1 GM/DL — SIGNIFICANT CHANGE UP (ref 32–36)
MCV RBC AUTO: 87.4 FL — SIGNIFICANT CHANGE UP (ref 80–100)
NRBC # BLD: 0 /100 WBCS — SIGNIFICANT CHANGE UP (ref 0–0)
NRBC BLD-RTO: 0 /100 WBCS — SIGNIFICANT CHANGE UP (ref 0–0)
PHOSPHATE SERPL-MCNC: 2.4 MG/DL — LOW (ref 2.5–4.5)
PLATELET # BLD AUTO: 160 K/UL — SIGNIFICANT CHANGE UP (ref 150–400)
POTASSIUM SERPL-MCNC: 3.8 MMOL/L — SIGNIFICANT CHANGE UP (ref 3.5–5.3)
POTASSIUM SERPL-MCNC: 4 MMOL/L — SIGNIFICANT CHANGE UP (ref 3.5–5.3)
POTASSIUM SERPL-MCNC: 4.1 MMOL/L — SIGNIFICANT CHANGE UP (ref 3.5–5.3)
POTASSIUM SERPL-MCNC: 4.2 MMOL/L — SIGNIFICANT CHANGE UP (ref 3.5–5.3)
POTASSIUM SERPL-SCNC: 3.8 MMOL/L — SIGNIFICANT CHANGE UP (ref 3.5–5.3)
POTASSIUM SERPL-SCNC: 4 MMOL/L — SIGNIFICANT CHANGE UP (ref 3.5–5.3)
POTASSIUM SERPL-SCNC: 4.1 MMOL/L — SIGNIFICANT CHANGE UP (ref 3.5–5.3)
POTASSIUM SERPL-SCNC: 4.2 MMOL/L — SIGNIFICANT CHANGE UP (ref 3.5–5.3)
RBC # BLD: 3.49 M/UL — LOW (ref 3.8–5.2)
RBC # FLD: 16 % — HIGH (ref 10.3–14.5)
SODIUM SERPL-SCNC: 135 MMOL/L — SIGNIFICANT CHANGE UP (ref 135–145)
SODIUM SERPL-SCNC: 137 MMOL/L — SIGNIFICANT CHANGE UP (ref 135–145)
SODIUM SERPL-SCNC: 137 MMOL/L — SIGNIFICANT CHANGE UP (ref 135–145)
SODIUM SERPL-SCNC: 139 MMOL/L — SIGNIFICANT CHANGE UP (ref 135–145)
WBC # BLD: 9.61 K/UL — SIGNIFICANT CHANGE UP (ref 3.8–10.5)
WBC # FLD AUTO: 9.61 K/UL — SIGNIFICANT CHANGE UP (ref 3.8–10.5)

## 2024-07-03 PROCEDURE — 99292 CRITICAL CARE ADDL 30 MIN: CPT

## 2024-07-03 PROCEDURE — 99291 CRITICAL CARE FIRST HOUR: CPT

## 2024-07-03 RX ORDER — HEPARIN SODIUM 1000 [USP'U]/ML
5000 INJECTION INTRAVENOUS; SUBCUTANEOUS EVERY 8 HOURS
Refills: 0 | Status: DISCONTINUED | OUTPATIENT
Start: 2024-07-03 | End: 2024-07-06

## 2024-07-03 RX ORDER — SODIUM CHLORIDE 3 G/100ML
500 INJECTION, SOLUTION INTRAVENOUS
Refills: 0 | Status: DISCONTINUED | OUTPATIENT
Start: 2024-07-03 | End: 2024-07-04

## 2024-07-03 RX ORDER — SOD PHOS DI, MONO/K PHOS MONO 250 MG
1 TABLET ORAL
Refills: 0 | Status: COMPLETED | OUTPATIENT
Start: 2024-07-03 | End: 2024-07-03

## 2024-07-03 RX ADMIN — BUTALBITAL, ACETAMINOPHEN AND CAFFEINE 1 CAPSULE(S): 50; 325; 40 TABLET ORAL at 22:36

## 2024-07-03 RX ADMIN — Medication 10 MILLIGRAM(S): at 01:00

## 2024-07-03 RX ADMIN — HEPARIN SODIUM 5000 UNIT(S): 1000 INJECTION INTRAVENOUS; SUBCUTANEOUS at 22:06

## 2024-07-03 RX ADMIN — Medication 10 MILLIGRAM(S): at 11:50

## 2024-07-03 RX ADMIN — HEPARIN SODIUM 5000 UNIT(S): 1000 INJECTION INTRAVENOUS; SUBCUTANEOUS at 14:22

## 2024-07-03 RX ADMIN — Medication 10 MILLIGRAM(S): at 06:28

## 2024-07-03 RX ADMIN — Medication 3 GRAM(S): at 01:01

## 2024-07-03 RX ADMIN — SODIUM CHLORIDE 50 MILLILITER(S): 3 INJECTION, SOLUTION INTRAVENOUS at 18:41

## 2024-07-03 RX ADMIN — NIMODIPINE 60 MILLIGRAM(S): 30 CAPSULE, LIQUID FILLED ORAL at 06:29

## 2024-07-03 RX ADMIN — NIMODIPINE 60 MILLIGRAM(S): 30 CAPSULE, LIQUID FILLED ORAL at 18:41

## 2024-07-03 RX ADMIN — Medication 40 MILLIGRAM(S): at 11:48

## 2024-07-03 RX ADMIN — Medication 500 MILLIGRAM(S): at 22:06

## 2024-07-03 RX ADMIN — BUTALBITAL, ACETAMINOPHEN AND CAFFEINE 1 CAPSULE(S): 50; 325; 40 TABLET ORAL at 01:01

## 2024-07-03 RX ADMIN — BUTALBITAL, ACETAMINOPHEN AND CAFFEINE 1 CAPSULE(S): 50; 325; 40 TABLET ORAL at 14:22

## 2024-07-03 RX ADMIN — Medication 1 APPLICATION(S): at 06:28

## 2024-07-03 RX ADMIN — BUTALBITAL, ACETAMINOPHEN AND CAFFEINE 1 CAPSULE(S): 50; 325; 40 TABLET ORAL at 06:29

## 2024-07-03 RX ADMIN — BUTALBITAL, ACETAMINOPHEN AND CAFFEINE 1 CAPSULE(S): 50; 325; 40 TABLET ORAL at 15:22

## 2024-07-03 RX ADMIN — NIMODIPINE 60 MILLIGRAM(S): 30 CAPSULE, LIQUID FILLED ORAL at 14:22

## 2024-07-03 RX ADMIN — SODIUM CHLORIDE 75 MILLILITER(S): 3 INJECTION, SOLUTION INTRAVENOUS at 10:17

## 2024-07-03 RX ADMIN — BUTALBITAL, ACETAMINOPHEN AND CAFFEINE 1 CAPSULE(S): 50; 325; 40 TABLET ORAL at 18:41

## 2024-07-03 RX ADMIN — Medication 1000 MILLILITER(S): at 14:21

## 2024-07-03 RX ADMIN — Medication 3 GRAM(S): at 18:41

## 2024-07-03 RX ADMIN — Medication 0.1 MILLIGRAM(S): at 18:41

## 2024-07-03 RX ADMIN — NIMODIPINE 60 MILLIGRAM(S): 30 CAPSULE, LIQUID FILLED ORAL at 09:56

## 2024-07-03 RX ADMIN — NIMODIPINE 60 MILLIGRAM(S): 30 CAPSULE, LIQUID FILLED ORAL at 01:01

## 2024-07-03 RX ADMIN — Medication 500 MILLIGRAM(S): at 06:28

## 2024-07-03 RX ADMIN — BUTALBITAL, ACETAMINOPHEN AND CAFFEINE 1 CAPSULE(S): 50; 325; 40 TABLET ORAL at 07:23

## 2024-07-03 RX ADMIN — NIMODIPINE 60 MILLIGRAM(S): 30 CAPSULE, LIQUID FILLED ORAL at 22:06

## 2024-07-03 RX ADMIN — Medication 1 PACKET(S): at 09:56

## 2024-07-03 RX ADMIN — Medication 3 GRAM(S): at 06:28

## 2024-07-03 RX ADMIN — Medication 3 GRAM(S): at 11:48

## 2024-07-03 RX ADMIN — BUTALBITAL, ACETAMINOPHEN AND CAFFEINE 1 CAPSULE(S): 50; 325; 40 TABLET ORAL at 22:06

## 2024-07-03 RX ADMIN — BUTALBITAL, ACETAMINOPHEN AND CAFFEINE 1 CAPSULE(S): 50; 325; 40 TABLET ORAL at 19:41

## 2024-07-03 RX ADMIN — Medication 500 MILLIGRAM(S): at 14:22

## 2024-07-03 RX ADMIN — Medication 0.1 MILLIGRAM(S): at 06:29

## 2024-07-03 RX ADMIN — Medication 20 MILLIEQUIVALENT(S): at 20:12

## 2024-07-03 RX ADMIN — Medication 10 MILLIGRAM(S): at 18:41

## 2024-07-03 RX ADMIN — BUTALBITAL, ACETAMINOPHEN AND CAFFEINE 1 CAPSULE(S): 50; 325; 40 TABLET ORAL at 10:56

## 2024-07-03 RX ADMIN — Medication 1000 MILLILITER(S): at 06:51

## 2024-07-03 RX ADMIN — Medication 1 PACKET(S): at 07:27

## 2024-07-03 RX ADMIN — BUTALBITAL, ACETAMINOPHEN AND CAFFEINE 1 CAPSULE(S): 50; 325; 40 TABLET ORAL at 09:56

## 2024-07-03 RX ADMIN — Medication 1000 MILLILITER(S): at 16:57

## 2024-07-03 RX ADMIN — BUTALBITAL, ACETAMINOPHEN AND CAFFEINE 1 CAPSULE(S): 50; 325; 40 TABLET ORAL at 02:13

## 2024-07-04 LAB
ANION GAP SERPL CALC-SCNC: 7 MMOL/L — SIGNIFICANT CHANGE UP (ref 5–17)
ANION GAP SERPL CALC-SCNC: 8 MMOL/L — SIGNIFICANT CHANGE UP (ref 5–17)
ANION GAP SERPL CALC-SCNC: 8 MMOL/L — SIGNIFICANT CHANGE UP (ref 5–17)
BUN SERPL-MCNC: 5 MG/DL — LOW (ref 7–23)
CALCIUM SERPL-MCNC: 8.4 MG/DL — SIGNIFICANT CHANGE UP (ref 8.4–10.5)
CALCIUM SERPL-MCNC: 8.6 MG/DL — SIGNIFICANT CHANGE UP (ref 8.4–10.5)
CALCIUM SERPL-MCNC: 9.1 MG/DL — SIGNIFICANT CHANGE UP (ref 8.4–10.5)
CHLORIDE SERPL-SCNC: 101 MMOL/L — SIGNIFICANT CHANGE UP (ref 96–108)
CHLORIDE SERPL-SCNC: 106 MMOL/L — SIGNIFICANT CHANGE UP (ref 96–108)
CHLORIDE SERPL-SCNC: 108 MMOL/L — SIGNIFICANT CHANGE UP (ref 96–108)
CO2 SERPL-SCNC: 22 MMOL/L — SIGNIFICANT CHANGE UP (ref 22–31)
CO2 SERPL-SCNC: 23 MMOL/L — SIGNIFICANT CHANGE UP (ref 22–31)
CO2 SERPL-SCNC: 25 MMOL/L — SIGNIFICANT CHANGE UP (ref 22–31)
CREAT SERPL-MCNC: 0.63 MG/DL — SIGNIFICANT CHANGE UP (ref 0.5–1.3)
CREAT SERPL-MCNC: 0.64 MG/DL — SIGNIFICANT CHANGE UP (ref 0.5–1.3)
CREAT SERPL-MCNC: 0.68 MG/DL — SIGNIFICANT CHANGE UP (ref 0.5–1.3)
EGFR: 110 ML/MIN/1.73M2 — SIGNIFICANT CHANGE UP
EGFR: 110 ML/MIN/1.73M2 — SIGNIFICANT CHANGE UP
EGFR: 112 ML/MIN/1.73M2 — SIGNIFICANT CHANGE UP
GLUCOSE SERPL-MCNC: 102 MG/DL — HIGH (ref 70–99)
GLUCOSE SERPL-MCNC: 95 MG/DL — SIGNIFICANT CHANGE UP (ref 70–99)
GLUCOSE SERPL-MCNC: 99 MG/DL — SIGNIFICANT CHANGE UP (ref 70–99)
HCT VFR BLD CALC: 30.7 % — LOW (ref 34.5–45)
HGB BLD-MCNC: 10 G/DL — LOW (ref 11.5–15.5)
MAGNESIUM SERPL-MCNC: 1.6 MG/DL — SIGNIFICANT CHANGE UP (ref 1.6–2.6)
MAGNESIUM SERPL-MCNC: 2.4 MG/DL — SIGNIFICANT CHANGE UP (ref 1.6–2.6)
MAGNESIUM SERPL-MCNC: 2.7 MG/DL — HIGH (ref 1.6–2.6)
MCHC RBC-ENTMCNC: 28.2 PG — SIGNIFICANT CHANGE UP (ref 27–34)
MCHC RBC-ENTMCNC: 32.6 GM/DL — SIGNIFICANT CHANGE UP (ref 32–36)
MCV RBC AUTO: 86.7 FL — SIGNIFICANT CHANGE UP (ref 80–100)
NRBC # BLD: 0 /100 WBCS — SIGNIFICANT CHANGE UP (ref 0–0)
NRBC BLD-RTO: 0 /100 WBCS — SIGNIFICANT CHANGE UP (ref 0–0)
PHOSPHATE SERPL-MCNC: 2.9 MG/DL — SIGNIFICANT CHANGE UP (ref 2.5–4.5)
PHOSPHATE SERPL-MCNC: 3 MG/DL — SIGNIFICANT CHANGE UP (ref 2.5–4.5)
PHOSPHATE SERPL-MCNC: 3 MG/DL — SIGNIFICANT CHANGE UP (ref 2.5–4.5)
PLATELET # BLD AUTO: 153 K/UL — SIGNIFICANT CHANGE UP (ref 150–400)
POTASSIUM SERPL-MCNC: 3.3 MMOL/L — LOW (ref 3.5–5.3)
POTASSIUM SERPL-MCNC: 3.6 MMOL/L — SIGNIFICANT CHANGE UP (ref 3.5–5.3)
POTASSIUM SERPL-MCNC: 3.9 MMOL/L — SIGNIFICANT CHANGE UP (ref 3.5–5.3)
POTASSIUM SERPL-SCNC: 3.3 MMOL/L — LOW (ref 3.5–5.3)
POTASSIUM SERPL-SCNC: 3.6 MMOL/L — SIGNIFICANT CHANGE UP (ref 3.5–5.3)
POTASSIUM SERPL-SCNC: 3.9 MMOL/L — SIGNIFICANT CHANGE UP (ref 3.5–5.3)
RBC # BLD: 3.54 M/UL — LOW (ref 3.8–5.2)
RBC # FLD: 15.9 % — HIGH (ref 10.3–14.5)
SODIUM SERPL-SCNC: 134 MMOL/L — LOW (ref 135–145)
SODIUM SERPL-SCNC: 136 MMOL/L — SIGNIFICANT CHANGE UP (ref 135–145)
SODIUM SERPL-SCNC: 138 MMOL/L — SIGNIFICANT CHANGE UP (ref 135–145)
WBC # BLD: 9.14 K/UL — SIGNIFICANT CHANGE UP (ref 3.8–10.5)
WBC # FLD AUTO: 9.14 K/UL — SIGNIFICANT CHANGE UP (ref 3.8–10.5)

## 2024-07-04 PROCEDURE — 99291 CRITICAL CARE FIRST HOUR: CPT

## 2024-07-04 PROCEDURE — 99233 SBSQ HOSP IP/OBS HIGH 50: CPT

## 2024-07-04 PROCEDURE — 99231 SBSQ HOSP IP/OBS SF/LOW 25: CPT

## 2024-07-04 RX ORDER — SODIUM CHLORIDE 3 G/100ML
500 INJECTION, SOLUTION INTRAVENOUS
Refills: 0 | Status: DISCONTINUED | OUTPATIENT
Start: 2024-07-04 | End: 2024-07-05

## 2024-07-04 RX ORDER — FLUDROCORTISONE ACETATE 0.1 MG
0.2 TABLET ORAL EVERY 12 HOURS
Refills: 0 | Status: DISCONTINUED | OUTPATIENT
Start: 2024-07-04 | End: 2024-07-05

## 2024-07-04 RX ORDER — SODIUM CHLORIDE 3 G/100ML
500 INJECTION, SOLUTION INTRAVENOUS
Refills: 0 | Status: DISCONTINUED | OUTPATIENT
Start: 2024-07-04 | End: 2024-07-04

## 2024-07-04 RX ORDER — LEVETIRACETAM 10 MG/ML
500 INJECTION, SOLUTION INTRAVENOUS
Refills: 0 | Status: DISCONTINUED | OUTPATIENT
Start: 2024-07-04 | End: 2024-07-05

## 2024-07-04 RX ORDER — ONDANSETRON HCL/PF 4 MG/2 ML
4 VIAL (ML) INJECTION ONCE
Refills: 0 | Status: COMPLETED | OUTPATIENT
Start: 2024-07-04 | End: 2024-07-04

## 2024-07-04 RX ORDER — MAGNESIUM SULFATE 500 MG/ML
4 SYRINGE (ML) INJECTION ONCE
Refills: 0 | Status: COMPLETED | OUTPATIENT
Start: 2024-07-04 | End: 2024-07-04

## 2024-07-04 RX ADMIN — NIMODIPINE 60 MILLIGRAM(S): 30 CAPSULE, LIQUID FILLED ORAL at 09:30

## 2024-07-04 RX ADMIN — Medication 3 GRAM(S): at 23:02

## 2024-07-04 RX ADMIN — Medication 10 MILLIGRAM(S): at 00:23

## 2024-07-04 RX ADMIN — BUTALBITAL, ACETAMINOPHEN AND CAFFEINE 1 CAPSULE(S): 50; 325; 40 TABLET ORAL at 21:10

## 2024-07-04 RX ADMIN — HEPARIN SODIUM 5000 UNIT(S): 1000 INJECTION INTRAVENOUS; SUBCUTANEOUS at 14:39

## 2024-07-04 RX ADMIN — OXYCODONE HYDROCHLORIDE 10 MILLIGRAM(S): 30 TABLET ORAL at 03:23

## 2024-07-04 RX ADMIN — BUTALBITAL, ACETAMINOPHEN AND CAFFEINE 1 CAPSULE(S): 50; 325; 40 TABLET ORAL at 10:10

## 2024-07-04 RX ADMIN — NIMODIPINE 60 MILLIGRAM(S): 30 CAPSULE, LIQUID FILLED ORAL at 06:19

## 2024-07-04 RX ADMIN — Medication 40 MILLIEQUIVALENT(S): at 06:42

## 2024-07-04 RX ADMIN — Medication 3 GRAM(S): at 11:51

## 2024-07-04 RX ADMIN — BUTALBITAL, ACETAMINOPHEN AND CAFFEINE 1 CAPSULE(S): 50; 325; 40 TABLET ORAL at 18:34

## 2024-07-04 RX ADMIN — BUTALBITAL, ACETAMINOPHEN AND CAFFEINE 1 CAPSULE(S): 50; 325; 40 TABLET ORAL at 14:38

## 2024-07-04 RX ADMIN — Medication 3 GRAM(S): at 00:24

## 2024-07-04 RX ADMIN — BUTALBITAL, ACETAMINOPHEN AND CAFFEINE 1 CAPSULE(S): 50; 325; 40 TABLET ORAL at 06:18

## 2024-07-04 RX ADMIN — NIMODIPINE 60 MILLIGRAM(S): 30 CAPSULE, LIQUID FILLED ORAL at 21:10

## 2024-07-04 RX ADMIN — NIMODIPINE 60 MILLIGRAM(S): 30 CAPSULE, LIQUID FILLED ORAL at 14:39

## 2024-07-04 RX ADMIN — Medication 4 MILLIGRAM(S): at 14:39

## 2024-07-04 RX ADMIN — Medication 10 MILLIGRAM(S): at 06:20

## 2024-07-04 RX ADMIN — Medication 3 GRAM(S): at 06:23

## 2024-07-04 RX ADMIN — OXYCODONE HYDROCHLORIDE 10 MILLIGRAM(S): 30 TABLET ORAL at 09:30

## 2024-07-04 RX ADMIN — Medication 3 GRAM(S): at 18:33

## 2024-07-04 RX ADMIN — Medication 40 MILLIEQUIVALENT(S): at 14:40

## 2024-07-04 RX ADMIN — BUTALBITAL, ACETAMINOPHEN AND CAFFEINE 1 CAPSULE(S): 50; 325; 40 TABLET ORAL at 08:00

## 2024-07-04 RX ADMIN — HEPARIN SODIUM 5000 UNIT(S): 1000 INJECTION INTRAVENOUS; SUBCUTANEOUS at 21:10

## 2024-07-04 RX ADMIN — BUTALBITAL, ACETAMINOPHEN AND CAFFEINE 1 CAPSULE(S): 50; 325; 40 TABLET ORAL at 02:54

## 2024-07-04 RX ADMIN — Medication 0.1 MILLIGRAM(S): at 06:20

## 2024-07-04 RX ADMIN — BUTALBITAL, ACETAMINOPHEN AND CAFFEINE 1 CAPSULE(S): 50; 325; 40 TABLET ORAL at 22:00

## 2024-07-04 RX ADMIN — Medication 0.2 MILLIGRAM(S): at 18:34

## 2024-07-04 RX ADMIN — Medication 1000 MILLILITER(S): at 13:00

## 2024-07-04 RX ADMIN — SODIUM CHLORIDE 50 MILLILITER(S): 3 INJECTION, SOLUTION INTRAVENOUS at 04:49

## 2024-07-04 RX ADMIN — Medication 1 APPLICATION(S): at 06:18

## 2024-07-04 RX ADMIN — BUTALBITAL, ACETAMINOPHEN AND CAFFEINE 1 CAPSULE(S): 50; 325; 40 TABLET ORAL at 09:30

## 2024-07-04 RX ADMIN — Medication 500 MILLIGRAM(S): at 06:21

## 2024-07-04 RX ADMIN — BUTALBITAL, ACETAMINOPHEN AND CAFFEINE 1 CAPSULE(S): 50; 325; 40 TABLET ORAL at 03:24

## 2024-07-04 RX ADMIN — Medication 40 MILLIEQUIVALENT(S): at 18:34

## 2024-07-04 RX ADMIN — HEPARIN SODIUM 5000 UNIT(S): 1000 INJECTION INTRAVENOUS; SUBCUTANEOUS at 06:20

## 2024-07-04 RX ADMIN — NIMODIPINE 60 MILLIGRAM(S): 30 CAPSULE, LIQUID FILLED ORAL at 18:34

## 2024-07-04 RX ADMIN — OXYCODONE HYDROCHLORIDE 10 MILLIGRAM(S): 30 TABLET ORAL at 10:10

## 2024-07-04 RX ADMIN — BUTALBITAL, ACETAMINOPHEN AND CAFFEINE 1 CAPSULE(S): 50; 325; 40 TABLET ORAL at 19:26

## 2024-07-04 RX ADMIN — OXYCODONE HYDROCHLORIDE 5 MILLIGRAM(S): 30 TABLET ORAL at 23:21

## 2024-07-04 RX ADMIN — LEVETIRACETAM 500 MILLIGRAM(S): 10 INJECTION, SOLUTION INTRAVENOUS at 18:35

## 2024-07-04 RX ADMIN — OXYCODONE HYDROCHLORIDE 10 MILLIGRAM(S): 30 TABLET ORAL at 04:23

## 2024-07-04 RX ADMIN — BUTALBITAL, ACETAMINOPHEN AND CAFFEINE 1 CAPSULE(S): 50; 325; 40 TABLET ORAL at 15:57

## 2024-07-04 RX ADMIN — Medication 25 GRAM(S): at 08:39

## 2024-07-04 RX ADMIN — NIMODIPINE 60 MILLIGRAM(S): 30 CAPSULE, LIQUID FILLED ORAL at 02:54

## 2024-07-05 LAB
ANION GAP SERPL CALC-SCNC: 6 MMOL/L — SIGNIFICANT CHANGE UP (ref 5–17)
ANION GAP SERPL CALC-SCNC: 6 MMOL/L — SIGNIFICANT CHANGE UP (ref 5–17)
ANION GAP SERPL CALC-SCNC: 7 MMOL/L — SIGNIFICANT CHANGE UP (ref 5–17)
ANION GAP SERPL CALC-SCNC: 9 MMOL/L — SIGNIFICANT CHANGE UP (ref 5–17)
BUN SERPL-MCNC: 6 MG/DL — LOW (ref 7–23)
BUN SERPL-MCNC: 7 MG/DL — SIGNIFICANT CHANGE UP (ref 7–23)
BUN SERPL-MCNC: 8 MG/DL — SIGNIFICANT CHANGE UP (ref 7–23)
BUN SERPL-MCNC: 8 MG/DL — SIGNIFICANT CHANGE UP (ref 7–23)
CALCIUM SERPL-MCNC: 8.1 MG/DL — LOW (ref 8.4–10.5)
CALCIUM SERPL-MCNC: 8.3 MG/DL — LOW (ref 8.4–10.5)
CALCIUM SERPL-MCNC: 8.6 MG/DL — SIGNIFICANT CHANGE UP (ref 8.4–10.5)
CALCIUM SERPL-MCNC: 9 MG/DL — SIGNIFICANT CHANGE UP (ref 8.4–10.5)
CHLORIDE SERPL-SCNC: 108 MMOL/L — SIGNIFICANT CHANGE UP (ref 96–108)
CHLORIDE SERPL-SCNC: 109 MMOL/L — HIGH (ref 96–108)
CHLORIDE SERPL-SCNC: 109 MMOL/L — HIGH (ref 96–108)
CHLORIDE SERPL-SCNC: 110 MMOL/L — HIGH (ref 96–108)
CO2 SERPL-SCNC: 22 MMOL/L — SIGNIFICANT CHANGE UP (ref 22–31)
CO2 SERPL-SCNC: 24 MMOL/L — SIGNIFICANT CHANGE UP (ref 22–31)
CREAT SERPL-MCNC: 0.68 MG/DL — SIGNIFICANT CHANGE UP (ref 0.5–1.3)
CREAT SERPL-MCNC: 0.7 MG/DL — SIGNIFICANT CHANGE UP (ref 0.5–1.3)
CREAT SERPL-MCNC: 0.72 MG/DL — SIGNIFICANT CHANGE UP (ref 0.5–1.3)
CREAT SERPL-MCNC: 0.79 MG/DL — SIGNIFICANT CHANGE UP (ref 0.5–1.3)
EGFR: 106 ML/MIN/1.73M2 — SIGNIFICANT CHANGE UP
EGFR: 106 ML/MIN/1.73M2 — SIGNIFICANT CHANGE UP
EGFR: 109 ML/MIN/1.73M2 — SIGNIFICANT CHANGE UP
EGFR: 109 ML/MIN/1.73M2 — SIGNIFICANT CHANGE UP
EGFR: 110 ML/MIN/1.73M2 — SIGNIFICANT CHANGE UP
EGFR: 110 ML/MIN/1.73M2 — SIGNIFICANT CHANGE UP
EGFR: 95 ML/MIN/1.73M2 — SIGNIFICANT CHANGE UP
EGFR: 95 ML/MIN/1.73M2 — SIGNIFICANT CHANGE UP
GLUCOSE SERPL-MCNC: 109 MG/DL — HIGH (ref 70–99)
GLUCOSE SERPL-MCNC: 111 MG/DL — HIGH (ref 70–99)
GLUCOSE SERPL-MCNC: 111 MG/DL — HIGH (ref 70–99)
GLUCOSE SERPL-MCNC: 138 MG/DL — HIGH (ref 70–99)
HCT VFR BLD CALC: 29.5 % — LOW (ref 34.5–45)
HGB BLD-MCNC: 9.7 G/DL — LOW (ref 11.5–15.5)
MAGNESIUM SERPL-MCNC: 2 MG/DL — SIGNIFICANT CHANGE UP (ref 1.6–2.6)
MCHC RBC-ENTMCNC: 28.3 PG — SIGNIFICANT CHANGE UP (ref 27–34)
MCHC RBC-ENTMCNC: 32.9 GM/DL — SIGNIFICANT CHANGE UP (ref 32–36)
MCV RBC AUTO: 86 FL — SIGNIFICANT CHANGE UP (ref 80–100)
NRBC # BLD: 0 /100 WBCS — SIGNIFICANT CHANGE UP (ref 0–0)
NRBC BLD-RTO: 0 /100 WBCS — SIGNIFICANT CHANGE UP (ref 0–0)
PHOSPHATE SERPL-MCNC: 2.8 MG/DL — SIGNIFICANT CHANGE UP (ref 2.5–4.5)
PLATELET # BLD AUTO: 163 K/UL — SIGNIFICANT CHANGE UP (ref 150–400)
POTASSIUM SERPL-MCNC: 3.7 MMOL/L — SIGNIFICANT CHANGE UP (ref 3.5–5.3)
POTASSIUM SERPL-MCNC: 3.8 MMOL/L — SIGNIFICANT CHANGE UP (ref 3.5–5.3)
POTASSIUM SERPL-MCNC: 4.2 MMOL/L — SIGNIFICANT CHANGE UP (ref 3.5–5.3)
POTASSIUM SERPL-MCNC: 4.7 MMOL/L — SIGNIFICANT CHANGE UP (ref 3.5–5.3)
POTASSIUM SERPL-SCNC: 3.7 MMOL/L — SIGNIFICANT CHANGE UP (ref 3.5–5.3)
POTASSIUM SERPL-SCNC: 3.8 MMOL/L — SIGNIFICANT CHANGE UP (ref 3.5–5.3)
POTASSIUM SERPL-SCNC: 4.2 MMOL/L — SIGNIFICANT CHANGE UP (ref 3.5–5.3)
POTASSIUM SERPL-SCNC: 4.7 MMOL/L — SIGNIFICANT CHANGE UP (ref 3.5–5.3)
RBC # BLD: 3.43 M/UL — LOW (ref 3.8–5.2)
RBC # FLD: 16.4 % — HIGH (ref 10.3–14.5)
SODIUM SERPL-SCNC: 138 MMOL/L — SIGNIFICANT CHANGE UP (ref 135–145)
SODIUM SERPL-SCNC: 139 MMOL/L — SIGNIFICANT CHANGE UP (ref 135–145)
SODIUM SERPL-SCNC: 139 MMOL/L — SIGNIFICANT CHANGE UP (ref 135–145)
SODIUM SERPL-SCNC: 142 MMOL/L — SIGNIFICANT CHANGE UP (ref 135–145)
TROPONIN T, HIGH SENSITIVITY RESULT: <6 NG/L — SIGNIFICANT CHANGE UP (ref 0–51)
WBC # BLD: 7.34 K/UL — SIGNIFICANT CHANGE UP (ref 3.8–10.5)
WBC # FLD AUTO: 7.34 K/UL — SIGNIFICANT CHANGE UP (ref 3.8–10.5)

## 2024-07-05 PROCEDURE — 99231 SBSQ HOSP IP/OBS SF/LOW 25: CPT

## 2024-07-05 PROCEDURE — 99233 SBSQ HOSP IP/OBS HIGH 50: CPT

## 2024-07-05 PROCEDURE — 99254 IP/OBS CNSLTJ NEW/EST MOD 60: CPT

## 2024-07-05 RX ORDER — DESOXIMETASONE 0.25 %
1 CREAM (GRAM) TOPICAL
Refills: 0 | Status: DISCONTINUED | OUTPATIENT
Start: 2024-07-05 | End: 2024-07-06

## 2024-07-05 RX ORDER — SOD PHOS DI, MONO/K PHOS MONO 250 MG
1 TABLET ORAL ONCE
Refills: 0 | Status: COMPLETED | OUTPATIENT
Start: 2024-07-05 | End: 2024-07-05

## 2024-07-05 RX ORDER — FLUDROCORTISONE ACETATE 0.1 MG
0.1 TABLET ORAL EVERY 12 HOURS
Refills: 0 | Status: DISCONTINUED | OUTPATIENT
Start: 2024-07-05 | End: 2024-07-06

## 2024-07-05 RX ORDER — SODIUM CHLORIDE 3 G/100ML
500 INJECTION, SOLUTION INTRAVENOUS
Refills: 0 | Status: DISCONTINUED | OUTPATIENT
Start: 2024-07-05 | End: 2024-07-05

## 2024-07-05 RX ADMIN — NIMODIPINE 60 MILLIGRAM(S): 30 CAPSULE, LIQUID FILLED ORAL at 17:02

## 2024-07-05 RX ADMIN — BUTALBITAL, ACETAMINOPHEN AND CAFFEINE 1 CAPSULE(S): 50; 325; 40 TABLET ORAL at 05:02

## 2024-07-05 RX ADMIN — BUTALBITAL, ACETAMINOPHEN AND CAFFEINE 1 CAPSULE(S): 50; 325; 40 TABLET ORAL at 10:44

## 2024-07-05 RX ADMIN — OXYCODONE HYDROCHLORIDE 10 MILLIGRAM(S): 30 TABLET ORAL at 22:00

## 2024-07-05 RX ADMIN — Medication 40 MILLIGRAM(S): at 06:02

## 2024-07-05 RX ADMIN — Medication 3 GRAM(S): at 12:54

## 2024-07-05 RX ADMIN — OXYCODONE HYDROCHLORIDE 5 MILLIGRAM(S): 30 TABLET ORAL at 00:49

## 2024-07-05 RX ADMIN — Medication 40 MILLIEQUIVALENT(S): at 01:13

## 2024-07-05 RX ADMIN — SODIUM CHLORIDE 30 MILLILITER(S): 3 INJECTION, SOLUTION INTRAVENOUS at 18:57

## 2024-07-05 RX ADMIN — Medication 3 GRAM(S): at 05:01

## 2024-07-05 RX ADMIN — BUTALBITAL, ACETAMINOPHEN AND CAFFEINE 1 CAPSULE(S): 50; 325; 40 TABLET ORAL at 01:18

## 2024-07-05 RX ADMIN — Medication 1 PACKET(S): at 06:02

## 2024-07-05 RX ADMIN — HEPARIN SODIUM 5000 UNIT(S): 1000 INJECTION INTRAVENOUS; SUBCUTANEOUS at 05:01

## 2024-07-05 RX ADMIN — Medication 0.2 MILLIGRAM(S): at 05:01

## 2024-07-05 RX ADMIN — BUTALBITAL, ACETAMINOPHEN AND CAFFEINE 1 CAPSULE(S): 50; 325; 40 TABLET ORAL at 12:24

## 2024-07-05 RX ADMIN — Medication 40 MILLIEQUIVALENT(S): at 13:08

## 2024-07-05 RX ADMIN — LEVETIRACETAM 500 MILLIGRAM(S): 10 INJECTION, SOLUTION INTRAVENOUS at 05:02

## 2024-07-05 RX ADMIN — NIMODIPINE 60 MILLIGRAM(S): 30 CAPSULE, LIQUID FILLED ORAL at 13:13

## 2024-07-05 RX ADMIN — BUTALBITAL, ACETAMINOPHEN AND CAFFEINE 1 CAPSULE(S): 50; 325; 40 TABLET ORAL at 01:07

## 2024-07-05 RX ADMIN — Medication 650 MILLIGRAM(S): at 16:32

## 2024-07-05 RX ADMIN — HEPARIN SODIUM 5000 UNIT(S): 1000 INJECTION INTRAVENOUS; SUBCUTANEOUS at 13:10

## 2024-07-05 RX ADMIN — Medication 1 APPLICATION(S): at 05:02

## 2024-07-05 RX ADMIN — Medication 3 GRAM(S): at 17:02

## 2024-07-05 RX ADMIN — Medication 650 MILLIGRAM(S): at 17:24

## 2024-07-05 RX ADMIN — SODIUM CHLORIDE 50 MILLILITER(S): 3 INJECTION, SOLUTION INTRAVENOUS at 04:40

## 2024-07-05 RX ADMIN — OXYCODONE HYDROCHLORIDE 10 MILLIGRAM(S): 30 TABLET ORAL at 21:00

## 2024-07-05 RX ADMIN — HEPARIN SODIUM 5000 UNIT(S): 1000 INJECTION INTRAVENOUS; SUBCUTANEOUS at 21:56

## 2024-07-05 RX ADMIN — BUTALBITAL, ACETAMINOPHEN AND CAFFEINE 1 CAPSULE(S): 50; 325; 40 TABLET ORAL at 05:41

## 2024-07-05 RX ADMIN — Medication 1 APPLICATION(S): at 16:32

## 2024-07-05 RX ADMIN — Medication 0.1 MILLIGRAM(S): at 17:02

## 2024-07-05 RX ADMIN — NIMODIPINE 60 MILLIGRAM(S): 30 CAPSULE, LIQUID FILLED ORAL at 21:57

## 2024-07-05 RX ADMIN — NIMODIPINE 60 MILLIGRAM(S): 30 CAPSULE, LIQUID FILLED ORAL at 01:07

## 2024-07-05 RX ADMIN — NIMODIPINE 60 MILLIGRAM(S): 30 CAPSULE, LIQUID FILLED ORAL at 05:02

## 2024-07-06 ENCOUNTER — TRANSCRIPTION ENCOUNTER (OUTPATIENT)
Age: 44
End: 2024-07-06

## 2024-07-06 VITALS — TEMPERATURE: 98 F

## 2024-07-06 LAB
ANION GAP SERPL CALC-SCNC: 11 MMOL/L — SIGNIFICANT CHANGE UP (ref 5–17)
ANION GAP SERPL CALC-SCNC: 11 MMOL/L — SIGNIFICANT CHANGE UP (ref 5–17)
BUN SERPL-MCNC: 5 MG/DL — LOW (ref 7–23)
BUN SERPL-MCNC: 7 MG/DL — SIGNIFICANT CHANGE UP (ref 7–23)
CALCIUM SERPL-MCNC: 9.1 MG/DL — SIGNIFICANT CHANGE UP (ref 8.4–10.5)
CALCIUM SERPL-MCNC: 9.2 MG/DL — SIGNIFICANT CHANGE UP (ref 8.4–10.5)
CHLORIDE SERPL-SCNC: 102 MMOL/L — SIGNIFICANT CHANGE UP (ref 96–108)
CHLORIDE SERPL-SCNC: 103 MMOL/L — SIGNIFICANT CHANGE UP (ref 96–108)
CO2 SERPL-SCNC: 25 MMOL/L — SIGNIFICANT CHANGE UP (ref 22–31)
CO2 SERPL-SCNC: 26 MMOL/L — SIGNIFICANT CHANGE UP (ref 22–31)
CREAT SERPL-MCNC: 0.72 MG/DL — SIGNIFICANT CHANGE UP (ref 0.5–1.3)
CREAT SERPL-MCNC: 0.72 MG/DL — SIGNIFICANT CHANGE UP (ref 0.5–1.3)
EGFR: 106 ML/MIN/1.73M2 — SIGNIFICANT CHANGE UP
GLUCOSE SERPL-MCNC: 109 MG/DL — HIGH (ref 70–99)
GLUCOSE SERPL-MCNC: 93 MG/DL — SIGNIFICANT CHANGE UP (ref 70–99)
HCT VFR BLD CALC: 31.7 % — LOW (ref 34.5–45)
HGB BLD-MCNC: 10.5 G/DL — LOW (ref 11.5–15.5)
MAGNESIUM SERPL-MCNC: 1.7 MG/DL — SIGNIFICANT CHANGE UP (ref 1.6–2.6)
MCHC RBC-ENTMCNC: 28.5 PG — SIGNIFICANT CHANGE UP (ref 27–34)
MCHC RBC-ENTMCNC: 33.1 GM/DL — SIGNIFICANT CHANGE UP (ref 32–36)
MCV RBC AUTO: 85.9 FL — SIGNIFICANT CHANGE UP (ref 80–100)
NRBC # BLD: 0 /100 WBCS — SIGNIFICANT CHANGE UP (ref 0–0)
NRBC BLD-RTO: 0 /100 WBCS — SIGNIFICANT CHANGE UP (ref 0–0)
PHOSPHATE SERPL-MCNC: 3.8 MG/DL — SIGNIFICANT CHANGE UP (ref 2.5–4.5)
PLATELET # BLD AUTO: 165 K/UL — SIGNIFICANT CHANGE UP (ref 150–400)
POTASSIUM SERPL-MCNC: 3.7 MMOL/L — SIGNIFICANT CHANGE UP (ref 3.5–5.3)
POTASSIUM SERPL-MCNC: 3.8 MMOL/L — SIGNIFICANT CHANGE UP (ref 3.5–5.3)
POTASSIUM SERPL-SCNC: 3.7 MMOL/L — SIGNIFICANT CHANGE UP (ref 3.5–5.3)
POTASSIUM SERPL-SCNC: 3.8 MMOL/L — SIGNIFICANT CHANGE UP (ref 3.5–5.3)
RBC # BLD: 3.69 M/UL — LOW (ref 3.8–5.2)
RBC # FLD: 16.4 % — HIGH (ref 10.3–14.5)
SODIUM SERPL-SCNC: 138 MMOL/L — SIGNIFICANT CHANGE UP (ref 135–145)
SODIUM SERPL-SCNC: 140 MMOL/L — SIGNIFICANT CHANGE UP (ref 135–145)
WBC # BLD: 9.49 K/UL — SIGNIFICANT CHANGE UP (ref 3.8–10.5)
WBC # FLD AUTO: 9.49 K/UL — SIGNIFICANT CHANGE UP (ref 3.8–10.5)

## 2024-07-06 PROCEDURE — 97116 GAIT TRAINING THERAPY: CPT

## 2024-07-06 PROCEDURE — 85730 THROMBOPLASTIN TIME PARTIAL: CPT

## 2024-07-06 PROCEDURE — 97165 OT EVAL LOW COMPLEX 30 MIN: CPT

## 2024-07-06 PROCEDURE — 84484 ASSAY OF TROPONIN QUANT: CPT

## 2024-07-06 PROCEDURE — 84100 ASSAY OF PHOSPHORUS: CPT

## 2024-07-06 PROCEDURE — C1887: CPT

## 2024-07-06 PROCEDURE — 97162 PT EVAL MOD COMPLEX 30 MIN: CPT

## 2024-07-06 PROCEDURE — 82962 GLUCOSE BLOOD TEST: CPT

## 2024-07-06 PROCEDURE — 93306 TTE W/DOPPLER COMPLETE: CPT

## 2024-07-06 PROCEDURE — 86901 BLOOD TYPING SEROLOGIC RH(D): CPT

## 2024-07-06 PROCEDURE — 80053 COMPREHEN METABOLIC PANEL: CPT

## 2024-07-06 PROCEDURE — 70450 CT HEAD/BRAIN W/O DYE: CPT | Mod: MC

## 2024-07-06 PROCEDURE — C1769: CPT

## 2024-07-06 PROCEDURE — 86850 RBC ANTIBODY SCREEN: CPT

## 2024-07-06 PROCEDURE — 85027 COMPLETE CBC AUTOMATED: CPT

## 2024-07-06 PROCEDURE — C1760: CPT

## 2024-07-06 PROCEDURE — 80048 BASIC METABOLIC PNL TOTAL CA: CPT

## 2024-07-06 PROCEDURE — 83735 ASSAY OF MAGNESIUM: CPT

## 2024-07-06 PROCEDURE — A9585: CPT

## 2024-07-06 PROCEDURE — 84702 CHORIONIC GONADOTROPIN TEST: CPT

## 2024-07-06 PROCEDURE — C1894: CPT

## 2024-07-06 PROCEDURE — 71045 X-RAY EXAM CHEST 1 VIEW: CPT

## 2024-07-06 PROCEDURE — 85610 PROTHROMBIN TIME: CPT

## 2024-07-06 PROCEDURE — 83036 HEMOGLOBIN GLYCOSYLATED A1C: CPT

## 2024-07-06 PROCEDURE — 80061 LIPID PANEL: CPT

## 2024-07-06 PROCEDURE — 85025 COMPLETE CBC W/AUTO DIFF WBC: CPT

## 2024-07-06 PROCEDURE — 70553 MRI BRAIN STEM W/O & W/DYE: CPT | Mod: MC

## 2024-07-06 PROCEDURE — 36415 COLL VENOUS BLD VENIPUNCTURE: CPT

## 2024-07-06 PROCEDURE — 84443 ASSAY THYROID STIM HORMONE: CPT

## 2024-07-06 PROCEDURE — 94640 AIRWAY INHALATION TREATMENT: CPT

## 2024-07-06 PROCEDURE — 99232 SBSQ HOSP IP/OBS MODERATE 35: CPT

## 2024-07-06 PROCEDURE — 99233 SBSQ HOSP IP/OBS HIGH 50: CPT

## 2024-07-06 PROCEDURE — 85347 COAGULATION TIME ACTIVATED: CPT

## 2024-07-06 PROCEDURE — 83935 ASSAY OF URINE OSMOLALITY: CPT

## 2024-07-06 PROCEDURE — 84300 ASSAY OF URINE SODIUM: CPT

## 2024-07-06 PROCEDURE — 97168 OT RE-EVAL EST PLAN CARE: CPT

## 2024-07-06 PROCEDURE — 72156 MRI NECK SPINE W/O & W/DYE: CPT | Mod: MC

## 2024-07-06 PROCEDURE — 86900 BLOOD TYPING SEROLOGIC ABO: CPT

## 2024-07-06 RX ORDER — ACETAMINOPHEN 500 MG/5ML
2 LIQUID (ML) ORAL
Qty: 0 | Refills: 0 | DISCHARGE
Start: 2024-07-06

## 2024-07-06 RX ORDER — ONDANSETRON HCL/PF 4 MG/2 ML
4 VIAL (ML) INJECTION EVERY 6 HOURS
Refills: 0 | Status: DISCONTINUED | OUTPATIENT
Start: 2024-07-06 | End: 2024-07-06

## 2024-07-06 RX ORDER — NIMODIPINE 30 MG/1
2 CAPSULE, LIQUID FILLED ORAL
Qty: 36 | Refills: 0
Start: 2024-07-06 | End: 2024-07-08

## 2024-07-06 RX ORDER — OXYCODONE HYDROCHLORIDE 30 MG/1
1 TABLET ORAL
Qty: 20 | Refills: 0
Start: 2024-07-06 | End: 2024-07-10

## 2024-07-06 RX ORDER — NALOXONE HYDROCHLORIDE 0.4 MG/ML
1 INJECTION, SOLUTION INTRAMUSCULAR; INTRAVENOUS; SUBCUTANEOUS
Qty: 1 | Refills: 0
Start: 2024-07-06 | End: 2024-07-06

## 2024-07-06 RX ORDER — FLUDROCORTISONE ACETATE 0.1 MG
1 TABLET ORAL
Qty: 60 | Refills: 0
Start: 2024-07-06 | End: 2024-08-04

## 2024-07-06 RX ORDER — MAGNESIUM SULFATE 500 MG/ML
2 SYRINGE (ML) INJECTION ONCE
Refills: 0 | Status: COMPLETED | OUTPATIENT
Start: 2024-07-06 | End: 2024-07-06

## 2024-07-06 RX ADMIN — Medication 25 GRAM(S): at 07:42

## 2024-07-06 RX ADMIN — Medication 3 GRAM(S): at 00:37

## 2024-07-06 RX ADMIN — OXYCODONE HYDROCHLORIDE 5 MILLIGRAM(S): 30 TABLET ORAL at 08:47

## 2024-07-06 RX ADMIN — NIMODIPINE 60 MILLIGRAM(S): 30 CAPSULE, LIQUID FILLED ORAL at 06:06

## 2024-07-06 RX ADMIN — HEPARIN SODIUM 5000 UNIT(S): 1000 INJECTION INTRAVENOUS; SUBCUTANEOUS at 06:07

## 2024-07-06 RX ADMIN — Medication 40 MILLIGRAM(S): at 06:06

## 2024-07-06 RX ADMIN — Medication 10 MILLIGRAM(S): at 12:00

## 2024-07-06 RX ADMIN — BUTALBITAL, ACETAMINOPHEN AND CAFFEINE 1 CAPSULE(S): 50; 325; 40 TABLET ORAL at 00:39

## 2024-07-06 RX ADMIN — BUTALBITAL, ACETAMINOPHEN AND CAFFEINE 1 CAPSULE(S): 50; 325; 40 TABLET ORAL at 07:07

## 2024-07-06 RX ADMIN — NIMODIPINE 60 MILLIGRAM(S): 30 CAPSULE, LIQUID FILLED ORAL at 13:05

## 2024-07-06 RX ADMIN — BUTALBITAL, ACETAMINOPHEN AND CAFFEINE 1 CAPSULE(S): 50; 325; 40 TABLET ORAL at 02:39

## 2024-07-06 RX ADMIN — NIMODIPINE 60 MILLIGRAM(S): 30 CAPSULE, LIQUID FILLED ORAL at 09:57

## 2024-07-06 RX ADMIN — Medication 4 MILLIGRAM(S): at 07:42

## 2024-07-06 RX ADMIN — NIMODIPINE 60 MILLIGRAM(S): 30 CAPSULE, LIQUID FILLED ORAL at 02:20

## 2024-07-06 RX ADMIN — BUTALBITAL, ACETAMINOPHEN AND CAFFEINE 1 CAPSULE(S): 50; 325; 40 TABLET ORAL at 06:07

## 2024-07-06 RX ADMIN — OXYCODONE HYDROCHLORIDE 5 MILLIGRAM(S): 30 TABLET ORAL at 13:03

## 2024-07-06 RX ADMIN — Medication 3 GRAM(S): at 06:07

## 2024-07-06 RX ADMIN — Medication 0.1 MILLIGRAM(S): at 06:06

## 2024-07-06 RX ADMIN — OXYCODONE HYDROCHLORIDE 5 MILLIGRAM(S): 30 TABLET ORAL at 09:45

## 2024-07-06 RX ADMIN — HEPARIN SODIUM 5000 UNIT(S): 1000 INJECTION INTRAVENOUS; SUBCUTANEOUS at 13:05

## 2024-07-08 ENCOUNTER — APPOINTMENT (OUTPATIENT)
Dept: NEUROSURGERY | Facility: CLINIC | Age: 44
End: 2024-07-08
Payer: MEDICAID

## 2024-07-08 VITALS
BODY MASS INDEX: 27.88 KG/M2 | TEMPERATURE: 97.1 F | WEIGHT: 142 LBS | OXYGEN SATURATION: 97 % | SYSTOLIC BLOOD PRESSURE: 165 MMHG | RESPIRATION RATE: 18 BRPM | HEIGHT: 60 IN | DIASTOLIC BLOOD PRESSURE: 111 MMHG | HEART RATE: 94 BPM

## 2024-07-08 PROBLEM — D64.9 ANEMIA, UNSPECIFIED: Chronic | Status: ACTIVE | Noted: 2024-06-26

## 2024-07-08 PROBLEM — K29.70 GASTRITIS, UNSPECIFIED, WITHOUT BLEEDING: Chronic | Status: ACTIVE | Noted: 2024-06-26

## 2024-07-08 PROBLEM — Z00.00 ENCOUNTER FOR PREVENTIVE HEALTH EXAMINATION: Status: ACTIVE | Noted: 2024-07-08

## 2024-07-08 PROBLEM — J45.909 UNSPECIFIED ASTHMA, UNCOMPLICATED: Chronic | Status: ACTIVE | Noted: 2024-06-26

## 2024-07-08 PROCEDURE — 99205 OFFICE O/P NEW HI 60 MIN: CPT

## 2024-07-10 ENCOUNTER — INPATIENT (INPATIENT)
Facility: HOSPITAL | Age: 44
LOS: 0 days | Discharge: HOME | End: 2024-07-11
Attending: HOSPITALIST | Admitting: INTERNAL MEDICINE
Payer: MEDICAID

## 2024-07-10 VITALS
HEART RATE: 93 BPM | WEIGHT: 141.98 LBS | RESPIRATION RATE: 18 BRPM | DIASTOLIC BLOOD PRESSURE: 88 MMHG | HEIGHT: 60 IN | SYSTOLIC BLOOD PRESSURE: 150 MMHG | OXYGEN SATURATION: 98 % | TEMPERATURE: 99 F

## 2024-07-10 LAB
ALBUMIN SERPL ELPH-MCNC: 3.9 G/DL — SIGNIFICANT CHANGE UP (ref 3.3–5)
ALP SERPL-CCNC: 75 U/L — SIGNIFICANT CHANGE UP (ref 40–120)
ALT FLD-CCNC: 55 U/L — HIGH (ref 10–45)
ANION GAP SERPL CALC-SCNC: 7 MMOL/L — SIGNIFICANT CHANGE UP (ref 5–17)
AST SERPL-CCNC: 26 U/L — SIGNIFICANT CHANGE UP (ref 10–40)
BASOPHILS # BLD AUTO: 0.04 K/UL — SIGNIFICANT CHANGE UP (ref 0–0.2)
BASOPHILS NFR BLD AUTO: 0.5 % — SIGNIFICANT CHANGE UP (ref 0–2)
BILIRUB SERPL-MCNC: <0.2 MG/DL — SIGNIFICANT CHANGE UP (ref 0.2–1.2)
BUN SERPL-MCNC: 9 MG/DL — SIGNIFICANT CHANGE UP (ref 7–23)
CALCIUM SERPL-MCNC: 8.7 MG/DL — SIGNIFICANT CHANGE UP (ref 8.4–10.5)
CHLORIDE SERPL-SCNC: 103 MMOL/L — SIGNIFICANT CHANGE UP (ref 96–108)
CO2 SERPL-SCNC: 25 MMOL/L — SIGNIFICANT CHANGE UP (ref 22–31)
CREAT SERPL-MCNC: 0.9 MG/DL — SIGNIFICANT CHANGE UP (ref 0.5–1.3)
EGFR: 81 ML/MIN/1.73M2 — SIGNIFICANT CHANGE UP
EOSINOPHIL # BLD AUTO: 0.08 K/UL — SIGNIFICANT CHANGE UP (ref 0–0.5)
EOSINOPHIL NFR BLD AUTO: 1 % — SIGNIFICANT CHANGE UP (ref 0–6)
GLUCOSE SERPL-MCNC: 98 MG/DL — SIGNIFICANT CHANGE UP (ref 70–99)
HCT VFR BLD CALC: 30.5 % — LOW (ref 34.5–45)
HGB BLD-MCNC: 9.9 G/DL — LOW (ref 11.5–15.5)
IMM GRANULOCYTES NFR BLD AUTO: 0.6 % — SIGNIFICANT CHANGE UP (ref 0–0.9)
LYMPHOCYTES # BLD AUTO: 1.65 K/UL — SIGNIFICANT CHANGE UP (ref 1–3.3)
LYMPHOCYTES # BLD AUTO: 20.2 % — SIGNIFICANT CHANGE UP (ref 13–44)
MCHC RBC-ENTMCNC: 28.4 PG — SIGNIFICANT CHANGE UP (ref 27–34)
MCHC RBC-ENTMCNC: 32.5 GM/DL — SIGNIFICANT CHANGE UP (ref 32–36)
MCV RBC AUTO: 87.6 FL — SIGNIFICANT CHANGE UP (ref 80–100)
MONOCYTES # BLD AUTO: 1.02 K/UL — HIGH (ref 0–0.9)
MONOCYTES NFR BLD AUTO: 12.5 % — SIGNIFICANT CHANGE UP (ref 2–14)
NEUTROPHILS # BLD AUTO: 5.34 K/UL — SIGNIFICANT CHANGE UP (ref 1.8–7.4)
NEUTROPHILS NFR BLD AUTO: 65.2 % — SIGNIFICANT CHANGE UP (ref 43–77)
NRBC # BLD: 0 /100 WBCS — SIGNIFICANT CHANGE UP (ref 0–0)
PLATELET # BLD AUTO: 201 K/UL — SIGNIFICANT CHANGE UP (ref 150–400)
POTASSIUM SERPL-MCNC: 4.1 MMOL/L — SIGNIFICANT CHANGE UP (ref 3.5–5.3)
POTASSIUM SERPL-SCNC: 4.1 MMOL/L — SIGNIFICANT CHANGE UP (ref 3.5–5.3)
PROT SERPL-MCNC: 7 G/DL — SIGNIFICANT CHANGE UP (ref 6–8.3)
RBC # BLD: 3.48 M/UL — LOW (ref 3.8–5.2)
RBC # FLD: 16.9 % — HIGH (ref 10.3–14.5)
SODIUM SERPL-SCNC: 135 MMOL/L — SIGNIFICANT CHANGE UP (ref 135–145)
WBC # BLD: 8.18 K/UL — SIGNIFICANT CHANGE UP (ref 3.8–10.5)
WBC # FLD AUTO: 8.18 K/UL — SIGNIFICANT CHANGE UP (ref 3.8–10.5)

## 2024-07-10 PROCEDURE — 93010 ELECTROCARDIOGRAM REPORT: CPT

## 2024-07-10 PROCEDURE — 93970 EXTREMITY STUDY: CPT | Mod: 26

## 2024-07-10 PROCEDURE — 99285 EMERGENCY DEPT VISIT HI MDM: CPT

## 2024-07-10 PROCEDURE — 99223 1ST HOSP IP/OBS HIGH 75: CPT

## 2024-07-10 PROCEDURE — 70450 CT HEAD/BRAIN W/O DYE: CPT | Mod: 26,MC

## 2024-07-10 RX ORDER — ACETAMINOPHEN 325 MG
650 TABLET ORAL ONCE
Refills: 0 | Status: COMPLETED | OUTPATIENT
Start: 2024-07-10 | End: 2024-07-10

## 2024-07-10 RX ORDER — FAMOTIDINE 40 MG
20 TABLET ORAL ONCE
Refills: 0 | Status: COMPLETED | OUTPATIENT
Start: 2024-07-10 | End: 2024-07-10

## 2024-07-10 RX ORDER — SODIUM CHLORIDE 0.9 % (FLUSH) 0.9 %
3 SYRINGE (ML) INJECTION EVERY 6 HOURS
Refills: 0 | Status: DISCONTINUED | OUTPATIENT
Start: 2024-07-10 | End: 2024-07-11

## 2024-07-10 RX ORDER — HYDRALAZINE HYDROCHLORIDE 50 MG/1
10 TABLET ORAL ONCE
Refills: 0 | Status: COMPLETED | OUTPATIENT
Start: 2024-07-10 | End: 2024-07-10

## 2024-07-10 RX ORDER — OXYCODONE HYDROCHLORIDE 100 MG/5ML
5 SOLUTION ORAL ONCE
Refills: 0 | Status: DISCONTINUED | OUTPATIENT
Start: 2024-07-10 | End: 2024-07-10

## 2024-07-10 RX ADMIN — HYDRALAZINE HYDROCHLORIDE 10 MILLIGRAM(S): 50 TABLET ORAL at 21:50

## 2024-07-10 RX ADMIN — Medication 650 MILLIGRAM(S): at 16:32

## 2024-07-10 RX ADMIN — OXYCODONE HYDROCHLORIDE 5 MILLIGRAM(S): 100 SOLUTION ORAL at 21:50

## 2024-07-10 RX ADMIN — Medication 20 MILLIGRAM(S): at 18:01

## 2024-07-10 RX ADMIN — Medication 650 MILLIGRAM(S): at 21:20

## 2024-07-10 NOTE — H&P ADULT - PROBLEM SELECTOR PLAN 3
#LE edema  Pt with bilateral LE liat worse on L was found to have DVT in right calf. unclear chronicity and unclear if dVT is the actual etiology of edema, as symptoms are bilateral and worse on side without DVT. WWP with good pulses and no skin olor changes. Low suspicion for compartment syndrome. Per NSGY there is no contraindication to AC. Heme consulted, who recommended risk/benefit discussion of given recent SAH with possibility of interval surveillance of DVT. We favor this approach.    - No AC for now  - Monitor symptoms  - Outpt follow-up for interval surveillance of DVT.

## 2024-07-10 NOTE — H&P ADULT - ATTENDING COMMENTS
43 yo F with PMHx asthma, anemia, gastritis, recent admission for non-traumatic SAH s/p diagnostic angio x2 (6/26-7/6) px from home at urging of outpatient neurosurgery due to concern for 1-2d hx of BLE edema, found to have new DVT, admitted for AC consideration in high-risk patient.      Initial VS concerning for HTN to 183/109 with mild headache, improved s/p IV hydralazine in ED. Pt reports ongoing headaches since hospitalization for SAH, unchanged from baseline. CTH performed with stable residual hemorrhage i/s/o known SAH. Planned for coiling 7/24. Hb 9.9, stable from discharge Hb 10.5 on 7/6. BLE Doppler performed in ED due to concern for BLE edema, found to have DVT in muscular branch of R calf. Likely provoked i/s/o recent prolonged hospitalization with immobility. Neurosurgery consulted, cleared for AC however per further discussion with Hematology in ED, recommending monitoring off AC and repeat Doppler in 1 week in small stable DVT with high-risk of bleed. Admitted for further consideration and BP control.      [ ] Hold off AC at this time pending further risk-benefit consideration    [ ] Heme/Onc consulted in ED -> Monitor off AC + Repeat Doppler x1wk   [ ] Neurosurgery consulted in ED -> Cleared for AC    [ ] Strict BP control (sBP goal <160)   [ ] Fall precautions 45 yo F with PMHx asthma, anemia, gastritis, recent admission for non-traumatic SAH s/p diagnostic angio x2 (6/26-7/6) px from home at urging of outpatient neurosurgery due to concern for 1-2d hx of BLE edema, found to have new DVT, admitted for AC consideration in high-risk patient.      Initial VS concerning for HTN to 183/109 with mild headache, improved s/p IV hydralazine in ED. EKG reviewed. Pt reports ongoing headaches since hospitalization for SAH, unchanged from baseline. CTH performed with stable residual hemorrhage i/s/o known SAH. Planned for coiling 7/24. Hb 9.9, stable from discharge Hb 10.5 on 7/6. BLE Doppler performed in ED due to concern for BLE edema, found to have DVT in muscular branch of R calf. Likely provoked i/s/o recent prolonged hospitalization with immobility. Neurosurgery consulted, cleared for AC however per further discussion with Hematology in ED, recommending monitoring off AC and repeat Doppler in 1 week in small stable DVT with high-risk of bleed. Admitted for further consideration and BP control.      [ ] Hold off AC at this time pending further risk-benefit consideration    [ ] Heme/Onc consulted in ED -> Monitor off AC + Repeat Doppler x1wk   [ ] Neurosurgery consulted in ED -> Cleared for AC    [ ] Strict BP control (sBP goal <160)   [ ] Fall precautions 45 yo F with PMHx asthma, anemia, gastritis, recent admission for non-traumatic SAH s/p diagnostic angio x2 (6/26-7/6) px from home at urging of outpatient neurosurgery due to concern for 1-2d hx of BLE edema, found to have new DVT, admitted for AC consideration in high-risk patient.      Initial VS concerning for HTN to 183/109 with mild headache, improved s/p IV hydralazine in ED. EKG reviewed. Pt reports ongoing headaches since hospitalization for SAH, unchanged from baseline. Suspect contributing to elevated BP. CTH performed with stable residual hemorrhage i/s/o known SAH. Planned for coiling 7/24. Hb 9.9, stable from discharge Hb 10.5 on 7/6. BLE Doppler performed in ED due to concern for BLE edema, found to have DVT in muscular branch of R calf. Likely provoked i/s/o recent prolonged hospitalization with immobility. Neurosurgery consulted, cleared for AC however per further discussion with Hematology in ED, recommending monitoring off AC and repeat Doppler in 1 week in small stable DVT with high-risk of bleed. Admitted for further consideration and BP control.      [ ] Hold off AC at this time pending further risk-benefit consideration    [ ] Heme/Onc consulted in ED -> Monitor off AC + Repeat Doppler x1wk   [ ] Neurosurgery consulted in ED -> Cleared for AC    [ ] Strict BP control (sBP goal <160)   [ ] Fall precautions

## 2024-07-10 NOTE — ED PROVIDER NOTE - CLINICAL SUMMARY MEDICAL DECISION MAKING FREE TEXT BOX
44F c PMH of SAH (6/26/24, diagnostic angio 7/2/24 showing R pcomm infundibulum, ?L anterior choroidal aneurysm), anemia, asthma, gastritis p/w 1 day h/o BLE edema. On exam, BP  initially 150/88, worsened to  183/109, vital signs otherwise within limits, positive bilateral lower extremity edema.      DDx: Electrolyte abnormality versus DVT versus lymphedema     plan:  -  Labs: Hemoglobin 9 hematocrit 9.9 and 38, patient has history of anemia, CMP grossly unremarkable,  - US RLE: IMPRESSION:    Deep venous thrombus is in a muscular branch in the right calf.    No evidence of deep venous thrombosis in the left lower extremity.  - CTH: IMPRESSION:  Subtle increased attenuation appreciated on the left within the region of   the brachium pontis. It is unclear if this represents artifact or   residual hemorrhage. Attention on follow-up evaluation. Cannot accurately   assess for presence of a residual/recurrent aneurysm in this noncontrast   study. Consider further evaluation via CTA and/or MRA, as indicated.  -   Neurosurgery states they reviewed the CT head and that it appears stable and patient can be started on Eliquis 5 mg twice daily for DVT, however patient's blood pressure  increased to 183/109, patient was given Tylenol x 2 and oxycodone for headache.  Blood pressure improved however remains over 160 systolic at 163.  Discussed with heme-onc  who states that it is reasonable clinically as the DVT is below the knee patient is increased risk of bleeding to defer blood thinners until a repeat ultrasound is performed of the lower extremity.  Vascular surgery was consulted and states patient can follow-up in their office.  However given the patient's systolic blood pressures over 160 with known aneurysm and hyperattenuation on CT head, neurosurgery states there is no neurosurgical intervention so patient to be admitted to medicine, discussed with BASILIA who states she will call back.

## 2024-07-10 NOTE — PROVIDER CONTACT NOTE (CRITICAL VALUE NOTIFICATION) - SITUATION
Notified Dr. Garner of pt's blood pressure of 183/109. Advised that provider would contact neurosurgery and advise of orders.

## 2024-07-10 NOTE — H&P ADULT - NSHPPHYSICALEXAM_GEN_ALL_CORE
VITAL SIGNS:  Vital Signs Last 24 Hrs  T(C): 36.8 (10 Jul 2024 15:10), Max: 37.2 (10 Jul 2024 13:18)  T(F): 98.3 (10 Jul 2024 15:10), Max: 98.9 (10 Jul 2024 13:18)  HR: 98 (10 Jul 2024 22:43) (93 - 98)  BP: 163/97 (10 Jul 2024 22:43) (150/88 - 183/109)  BP(mean): --  RR: 16 (10 Jul 2024 22:43) (16 - 18)  SpO2: 97% (10 Jul 2024 22:43) (97% - 100%)    Parameters below as of 10 Jul 2024 22:43  Patient On (Oxygen Delivery Method): room air      PHYSICAL EXAM:  General: NAD; speaking in full sentences, wearing sunglasses and complaiang of bright lights  HEENT: NC/AT; PERRL; EOMI; MMM  Neck: supple; no JVD  Cardiac: RRR; +S1/S2  Pulm: CTA B/L; no W/R/R  GI: soft, NT/ND, +BS  Extremities: WWP; BL LE edema worse on L no clubbing or cyanosis  Vasc: 2+ radial, DP pulses B/L  Neuro: AAOx3; no focal deficits

## 2024-07-10 NOTE — ED ADULT NURSE NOTE - OBJECTIVE STATEMENT
Bilateral lower extremities swelling worsening today with R worse than L. Alert, oriented, and ambulatory at time of assessment. Reports recent clip placement for brain aneurysm. Denies fevers or chills. Reports persistent headache since discharge form the hospital. BEFAST (-). No new neurological symptoms within the last 24 hours. Denies chest pain or SOB. No syncope reported. No gait imbalance noted. Bilateral lower extremities swelling worsening today with R worse than L. Alert, oriented, and ambulatory at time of assessment. Reports recent subarachnoid hemmorrhage with surgery scheduled for the end of July. Denies fevers or chills. Reports persistent headache since discharge form the hospital. BEFAST (-). No new neurological symptoms within the last 24 hours. Denies chest pain or SOB. No syncope reported. No gait imbalance noted.

## 2024-07-10 NOTE — ED PROVIDER NOTE - NS ED ROS FT
Positive: Bilateral lower extremity edema, headache   negative: Fever, chills, congestion, cough, hemoptysis, chest pain, shortness of breath, nausea, vomiting, diarrhea, abdominal pain, dysuria, hematuria, rash, trauma

## 2024-07-10 NOTE — H&P ADULT - NSHPLABSRESULTS_GEN_ALL_CORE
LABS:                          9.9    8.18  )-----------( 201      ( 10 Jul 2024 14:18 )             30.5     07-10    135  |  103  |  9   ----------------------------<  98  4.1   |  25  |  0.90    Ca    8.7      10 Jul 2024 14:18    TPro  7.0  /  Alb  3.9  /  TBili  <0.2  /  DBili  x   /  AST  26  /  ALT  55<H>  /  AlkPhos  75  07-10    LIVER FUNCTIONS - ( 10 Jul 2024 14:18 )  Alb: 3.9 g/dL / Pro: 7.0 g/dL / ALK PHOS: 75 U/L / ALT: 55 U/L / AST: 26 U/L / GGT: x             Urinalysis Basic - ( 10 Jul 2024 14:18 )    Color: x / Appearance: x / SG: x / pH: x  Gluc: 98 mg/dL / Ketone: x  / Bili: x / Urobili: x   Blood: x / Protein: x / Nitrite: x   Leuk Esterase: x / RBC: x / WBC x   Sq Epi: x / Non Sq Epi: x / Bacteria: x

## 2024-07-10 NOTE — H&P ADULT - ASSESSMENT
44F Hx of SAH (6/26/24, diagnostic angio 7/2/24 showing R pcomm infundibulum, ?L anterior choroidal aneurysm), anemia, asthma, gastritis p/w 1 day h/o B/L LE edema. FOund to have R soleal DVT.

## 2024-07-10 NOTE — H&P ADULT - HISTORY OF PRESENT ILLNESS
44F Hx of SAH (6/26/24, diagnostic angio 7/2/24 showing R pcomm infundibulum, ?L anterior choroidal aneurysm), anemia, asthma, gastritis p/w 1 day h/o B/L LE edema. She reports sudent onset swellign of her bilateral feet up to her calves worse on the left. The swellign was painless. She reports no changes to sensation or changes in skin color. Of note she was transferred from [place] and admitted at the end of June for SAH 2/2 newly discovered intracranial aneurysm. She initially presented for severe headache, which has mostlyimporved, but she still has residual headache and photophobia. She is scheduled for coiling of her brain aneurysm on 7/24/24, hasn't had intervention yet. No h/o similar sx. Pt reports HA that is at her baseline since the SAH, not worsened, She was discharged on a 5-day course of oxycodone for the pain, which has now completed. Denies personal/FH of DVT/PE. Denies cp/sob    In the ED:  Initial vital signs: T: 98.9 F, HR: 93, BP: 150/88 > 183/109, R: 16, SpO2: 98% on RA  ED course:   Labs: significant for  Imaging:  CTH with Subtle increased attenuation appreciated on the left within the region of the brachium pontis. It is unclear if this represents artifact or residual hemorrhage.  LE Doppler Deep venous thrombus is in a muscular branch in the right calf, likely soleal.  Medications: hydral 10, oxy 5, tylenol  Consults:   NSGY: Not concerned about subtle hyperattenuation. SAH is stable. No intervention.  Heme: choice to anticoagulate should be a shared risk/benefit discussion between patient and primary team, could consider observation with a short interval US follow up if patient is asymptomatic without risks for clot propagation

## 2024-07-10 NOTE — ED PROVIDER NOTE - OBJECTIVE STATEMENT
44F c PMH of SAH (diagnostic angio showing R pcomm infundibulum, ?L anterior choroidal aneurysm), anemia, asthma, gastritis p/w 1 day h/o BLE edema. 44F c PMH of SAH (6/26/24, diagnostic angio 7/2/24 showing R pcomm infundibulum, ?L anterior choroidal aneurysm), anemia, asthma, gastritis p/w 1 day h/o BLE edema. 44F c PMH of SAH (6/26/24, diagnostic angio 7/2/24 showing R pcomm infundibulum, ?L anterior choroidal aneurysm), anemia, asthma, gastritis p/w 1 day h/o BLE edema. is scheduled for coiling of her brain aneurysm on 7/24/24 No h/o similar sx. Pt reports HA that is at her baseline since the SAH, not worsened, states she takes oxycodone for this pain. denies personal/FH of DVT/PE. denies cp/sob. denies bloodthinner use. 44F c PMH of SAH (6/26/24, diagnostic angio 7/2/24 showing R pcomm infundibulum, ?L anterior choroidal aneurysm), anemia, asthma, gastritis p/w 1 day h/o BLE edema. is scheduled for craniotomy for clipping of her brain aneurysm on 7/24/24, hasn't had intervention yet. No h/o similar sx. Pt reports HA that is at her baseline since the SAH, not worsened, states she takes oxycodone for this pain. denies personal/FH of DVT/PE. denies cp/sob. denies bloodthinner use.

## 2024-07-10 NOTE — CONSULT NOTE ADULT - ASSESSMENT
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 Patient found to have R IM calf DVT on LE dopplers. Neurosurgery consulted for AC clearance i/s/o recent non-traumatic SAH.    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 Patient found to have R IM calf DVT on LE dopplers. Neurosurgery consulted for AC clearance i/s/o recent non-traumatic SAH.    Plan:  -Recommend obtaining CTH  -After reviewing repeat CTH, appears stable, ok to start Eliquis 5mg BID  -Remainder of care per primary team  - Please have patient follow up with Dr. Quintana in the outpatient office on Monday, 7/15: [phone] top schedule appointment     D/w Dr Quintana and ED provider

## 2024-07-10 NOTE — H&P ADULT - PROBLEM SELECTOR PLAN 2
Pt with BP spike to 180/109 s/p hydral 10 in ED with imporvement to /97. No signs of end-organ damage. Reports headache, but is subacute 2/2 SAH.    - Monitor BP  - Hydral prn

## 2024-07-10 NOTE — ED PROVIDER NOTE - PROGRESS NOTE DETAILS
MD Patsy:   spoke with martine to discuss admission as pt still has sbp > 163 and neurosurgery stated that management of bp would not require admission to neurosurgery rather to medicine. martine stated their attending wanted to have pt evaluated by medicine team prior to accepting admission. they are evaluating pt now and will relay next steps to night team.    At this time, at the end of my shift, the patient was signed out to the night team including Dr. Jansen and KISHOR Aguiar. The patient's disposition, as well as clinical decisions, or clinical interventions that take place after this time reflect the clinical and medical decision making of the night team. MD Patsy: pt accepted for admission by MARTINE d/w martine

## 2024-07-10 NOTE — ED PROVIDER NOTE - PHYSICAL EXAMINATION
GENERAL:  Awake, alert and in NAD, non-toxic appearing  ENMT: Airway patent  EYES: conjunctiva clear  CARDIAC: Regular rate, regular rhythm.  Heart sounds S1, S2, no S3, S4. No murmurs, rubs or gallops.  RESPIRATORY: Breath sounds clear and equal in bilateral anterior lung fields, no wheezes/ronchi/crackles/stridor; pt breathing and speaking comfortably with no increased WOB, no accessory mm. use, no intercostal retractions, no nasal flaring  GI: abdomen soft, non-distended, non-tender, no rebound or guarding.  SKIN: warm and dry, no rashes  PSYCH: awake, alert, calm and cooperative  EXTREMITIES: non-pitting ble edema  NEURO: gcs 15

## 2024-07-10 NOTE — H&P ADULT - PROBLEM SELECTOR PLAN 1
#Headache  Pt with was transferred from [place] and admitted at the end of June for SAH 2/2 newly discovered intracranial aneurysm. She initially presented for severe headache, which has improved, but she still has residual headache and photophobia. She is scheduled for coiling of her brain aneurysm on 7/24/24, hasn't had intervention yet.  CTH this admission with  Subtle increased attenuation appreciated on the left within the region of the brachium pontis. It is unclear if this represents artifact or residual hemorrhage. NSGY consulted  in ED. They are not concerned about hyperattenuation. SAH stable per NSGY. Recommend no intervention at present.    - F/u NSGY recs  - Monitor neuro status and headache. with low threshold for escalation of care  - Pain control:           > Tylenol          > Oxy 5 Q4 prn for severe pain  - Ensure follow-up with NSGY at discharge #Headache  Pt with was transferred from [place] and admitted at the end of June for SAH 2/2 newly discovered intracranial aneurysm. She initially presented for severe headache, which has improved, but she still has residual headache and photophobia. She is scheduled for coiling of her brain aneurysm on 7/24/24, hasn't had intervention yet.  CTH this admission with  Subtle increased attenuation appreciated on the left within the region of the brachium pontis. It is unclear if this represents artifact or residual hemorrhage. NSGY consulted  in ED. They are not concerned about hyperattenuation. SAH stable per NSGY. Recommend no intervention at present.    - F/u NSGY recs  - Monitor neuro status and headache. with low threshold for escalation of care  - Pain control:           > Tylenol          > Oxy 5 Q4 prn for severe pain  - Neuro checks Q6  - Ensure follow-up with NSGY at discharge

## 2024-07-10 NOTE — ED ADULT TRIAGE NOTE - OTHER COMPLAINTS
Patient reports swelling to bilateral feet and ankles since yesterday. Denies chest pain or shortness of breath.

## 2024-07-10 NOTE — ED ADULT NURSE NOTE - NSFALLUNIVINTERV_ED_ALL_ED
Bed/Stretcher in lowest position, wheels locked, appropriate side rails in place/Call bell, personal items and telephone in reach/Instruct patient to call for assistance before getting out of bed/chair/stretcher/Non-slip footwear applied when patient is off stretcher/[place] to call system/Physically safe environment - no spills, clutter or unnecessary equipment/Purposeful proactive rounding/Room/bathroom lighting operational, light cord in reach

## 2024-07-11 ENCOUNTER — TRANSCRIPTION ENCOUNTER (OUTPATIENT)
Age: 44
End: 2024-07-11

## 2024-07-11 VITALS — DIASTOLIC BLOOD PRESSURE: 108 MMHG | SYSTOLIC BLOOD PRESSURE: 167 MMHG | HEART RATE: 88 BPM

## 2024-07-11 DIAGNOSIS — I60.9 NONTRAUMATIC SUBARACHNOID HEMORRHAGE, UNSPECIFIED: ICD-10-CM

## 2024-07-11 DIAGNOSIS — K29.70 GASTRITIS, UNSPECIFIED, WITHOUT BLEEDING: ICD-10-CM

## 2024-07-11 DIAGNOSIS — Z29.9 ENCOUNTER FOR PROPHYLACTIC MEASURES, UNSPECIFIED: ICD-10-CM

## 2024-07-11 DIAGNOSIS — I16.0 HYPERTENSIVE URGENCY: ICD-10-CM

## 2024-07-11 DIAGNOSIS — I82.409 ACUTE EMBOLISM AND THROMBOSIS OF UNSPECIFIED DEEP VEINS OF UNSPECIFIED LOWER EXTREMITY: ICD-10-CM

## 2024-07-11 LAB
ALBUMIN SERPL ELPH-MCNC: 3.6 G/DL — SIGNIFICANT CHANGE UP (ref 3.3–5)
ALP SERPL-CCNC: 76 U/L — SIGNIFICANT CHANGE UP (ref 40–120)
ALT FLD-CCNC: 45 U/L — SIGNIFICANT CHANGE UP (ref 10–45)
ANION GAP SERPL CALC-SCNC: 9 MMOL/L — SIGNIFICANT CHANGE UP (ref 5–17)
APTT BLD: 29.5 SEC — SIGNIFICANT CHANGE UP (ref 24.5–35.6)
AST SERPL-CCNC: 20 U/L — SIGNIFICANT CHANGE UP (ref 10–40)
BASOPHILS # BLD AUTO: 0.02 K/UL — SIGNIFICANT CHANGE UP (ref 0–0.2)
BASOPHILS NFR BLD AUTO: 0.2 % — SIGNIFICANT CHANGE UP (ref 0–2)
BILIRUB SERPL-MCNC: <0.2 MG/DL — SIGNIFICANT CHANGE UP (ref 0.2–1.2)
BLD GP AB SCN SERPL QL: NEGATIVE — SIGNIFICANT CHANGE UP
BUN SERPL-MCNC: 5 MG/DL — LOW (ref 7–23)
CALCIUM SERPL-MCNC: 8.7 MG/DL — SIGNIFICANT CHANGE UP (ref 8.4–10.5)
CHLORIDE SERPL-SCNC: 105 MMOL/L — SIGNIFICANT CHANGE UP (ref 96–108)
CO2 SERPL-SCNC: 23 MMOL/L — SIGNIFICANT CHANGE UP (ref 22–31)
CREAT SERPL-MCNC: 0.62 MG/DL — SIGNIFICANT CHANGE UP (ref 0.5–1.3)
EGFR: 113 ML/MIN/1.73M2 — SIGNIFICANT CHANGE UP
EOSINOPHIL # BLD AUTO: 0.07 K/UL — SIGNIFICANT CHANGE UP (ref 0–0.5)
EOSINOPHIL NFR BLD AUTO: 0.8 % — SIGNIFICANT CHANGE UP (ref 0–6)
GLUCOSE SERPL-MCNC: 125 MG/DL — HIGH (ref 70–99)
HCT VFR BLD CALC: 30.7 % — LOW (ref 34.5–45)
HGB BLD-MCNC: 9.6 G/DL — LOW (ref 11.5–15.5)
IMM GRANULOCYTES NFR BLD AUTO: 0.4 % — SIGNIFICANT CHANGE UP (ref 0–0.9)
INR BLD: 0.93 — SIGNIFICANT CHANGE UP (ref 0.85–1.18)
LYMPHOCYTES # BLD AUTO: 1.34 K/UL — SIGNIFICANT CHANGE UP (ref 1–3.3)
LYMPHOCYTES # BLD AUTO: 15 % — SIGNIFICANT CHANGE UP (ref 13–44)
MAGNESIUM SERPL-MCNC: 1.8 MG/DL — SIGNIFICANT CHANGE UP (ref 1.6–2.6)
MCHC RBC-ENTMCNC: 28 PG — SIGNIFICANT CHANGE UP (ref 27–34)
MCHC RBC-ENTMCNC: 31.3 GM/DL — LOW (ref 32–36)
MCV RBC AUTO: 89.5 FL — SIGNIFICANT CHANGE UP (ref 80–100)
MONOCYTES # BLD AUTO: 0.86 K/UL — SIGNIFICANT CHANGE UP (ref 0–0.9)
MONOCYTES NFR BLD AUTO: 9.6 % — SIGNIFICANT CHANGE UP (ref 2–14)
NEUTROPHILS # BLD AUTO: 6.6 K/UL — SIGNIFICANT CHANGE UP (ref 1.8–7.4)
NEUTROPHILS NFR BLD AUTO: 74 % — SIGNIFICANT CHANGE UP (ref 43–77)
NRBC # BLD: 0 /100 WBCS — SIGNIFICANT CHANGE UP (ref 0–0)
PHOSPHATE SERPL-MCNC: 3.2 MG/DL — SIGNIFICANT CHANGE UP (ref 2.5–4.5)
PLATELET # BLD AUTO: 219 K/UL — SIGNIFICANT CHANGE UP (ref 150–400)
POTASSIUM SERPL-MCNC: 3.2 MMOL/L — LOW (ref 3.5–5.3)
POTASSIUM SERPL-SCNC: 3.2 MMOL/L — LOW (ref 3.5–5.3)
PROT SERPL-MCNC: 6.2 G/DL — SIGNIFICANT CHANGE UP (ref 6–8.3)
PROTHROM AB SERPL-ACNC: 10.6 SEC — SIGNIFICANT CHANGE UP (ref 9.5–13)
RBC # BLD: 3.43 M/UL — LOW (ref 3.8–5.2)
RBC # FLD: 16.5 % — HIGH (ref 10.3–14.5)
RH IG SCN BLD-IMP: POSITIVE — SIGNIFICANT CHANGE UP
SODIUM SERPL-SCNC: 137 MMOL/L — SIGNIFICANT CHANGE UP (ref 135–145)
WBC # BLD: 8.93 K/UL — SIGNIFICANT CHANGE UP (ref 3.8–10.5)
WBC # FLD AUTO: 8.93 K/UL — SIGNIFICANT CHANGE UP (ref 3.8–10.5)

## 2024-07-11 PROCEDURE — 70450 CT HEAD/BRAIN W/O DYE: CPT | Mod: MC

## 2024-07-11 PROCEDURE — 80053 COMPREHEN METABOLIC PANEL: CPT

## 2024-07-11 PROCEDURE — 99233 SBSQ HOSP IP/OBS HIGH 50: CPT

## 2024-07-11 PROCEDURE — 86850 RBC ANTIBODY SCREEN: CPT

## 2024-07-11 PROCEDURE — 99285 EMERGENCY DEPT VISIT HI MDM: CPT | Mod: 25

## 2024-07-11 PROCEDURE — 85730 THROMBOPLASTIN TIME PARTIAL: CPT

## 2024-07-11 PROCEDURE — 84100 ASSAY OF PHOSPHORUS: CPT

## 2024-07-11 PROCEDURE — 93970 EXTREMITY STUDY: CPT

## 2024-07-11 PROCEDURE — 85610 PROTHROMBIN TIME: CPT

## 2024-07-11 PROCEDURE — 93005 ELECTROCARDIOGRAM TRACING: CPT

## 2024-07-11 PROCEDURE — 99239 HOSP IP/OBS DSCHRG MGMT >30: CPT | Mod: GC

## 2024-07-11 PROCEDURE — 96374 THER/PROPH/DIAG INJ IV PUSH: CPT

## 2024-07-11 PROCEDURE — 85025 COMPLETE CBC W/AUTO DIFF WBC: CPT

## 2024-07-11 PROCEDURE — 86901 BLOOD TYPING SEROLOGIC RH(D): CPT

## 2024-07-11 PROCEDURE — 36415 COLL VENOUS BLD VENIPUNCTURE: CPT

## 2024-07-11 PROCEDURE — 83735 ASSAY OF MAGNESIUM: CPT

## 2024-07-11 PROCEDURE — 96375 TX/PRO/DX INJ NEW DRUG ADDON: CPT

## 2024-07-11 PROCEDURE — 86900 BLOOD TYPING SEROLOGIC ABO: CPT

## 2024-07-11 PROCEDURE — 99254 IP/OBS CNSLTJ NEW/EST MOD 60: CPT

## 2024-07-11 RX ORDER — ACETAMINOPHEN 325 MG
650 TABLET ORAL EVERY 8 HOURS
Refills: 0 | Status: DISCONTINUED | OUTPATIENT
Start: 2024-07-11 | End: 2024-07-11

## 2024-07-11 RX ORDER — POTASSIUM CHLORIDE 600 MG/1
40 TABLET, FILM COATED, EXTENDED RELEASE ORAL ONCE
Refills: 0 | Status: COMPLETED | OUTPATIENT
Start: 2024-07-11 | End: 2024-07-11

## 2024-07-11 RX ORDER — MAGNESIUM, ALUMINUM HYDROXIDE 400-400
30 TABLET,CHEWABLE ORAL EVERY 4 HOURS
Refills: 0 | Status: DISCONTINUED | OUTPATIENT
Start: 2024-07-11 | End: 2024-07-11

## 2024-07-11 RX ORDER — BUTALB/ACETAMINOPHEN/CAFFEINE 50-325-40
2 TABLET ORAL
Qty: 0 | Refills: 0 | DISCHARGE
Start: 2024-07-11

## 2024-07-11 RX ORDER — FAMOTIDINE 40 MG
1 TABLET ORAL
Qty: 0 | Refills: 0 | DISCHARGE
Start: 2024-07-11

## 2024-07-11 RX ORDER — OXYCODONE HYDROCHLORIDE 100 MG/5ML
5 SOLUTION ORAL EVERY 4 HOURS
Refills: 0 | Status: DISCONTINUED | OUTPATIENT
Start: 2024-07-11 | End: 2024-07-11

## 2024-07-11 RX ORDER — ACETAMINOPHEN 325 MG
650 TABLET ORAL EVERY 6 HOURS
Refills: 0 | Status: DISCONTINUED | OUTPATIENT
Start: 2024-07-11 | End: 2024-07-11

## 2024-07-11 RX ORDER — OXYCODONE HYDROCHLORIDE 100 MG/5ML
1 SOLUTION ORAL
Qty: 0 | Refills: 0 | DISCHARGE
Start: 2024-07-11

## 2024-07-11 RX ORDER — ENOXAPARIN SODIUM 100 MG/ML
40 INJECTION SUBCUTANEOUS EVERY 24 HOURS
Refills: 0 | Status: DISCONTINUED | OUTPATIENT
Start: 2024-07-11 | End: 2024-07-11

## 2024-07-11 RX ORDER — FAMOTIDINE 40 MG
40 TABLET ORAL EVERY 24 HOURS
Refills: 0 | Status: DISCONTINUED | OUTPATIENT
Start: 2024-07-11 | End: 2024-07-11

## 2024-07-11 RX ORDER — BUTALB/ACETAMINOPHEN/CAFFEINE 50-325-40
2 TABLET ORAL EVERY 8 HOURS
Refills: 0 | Status: DISCONTINUED | OUTPATIENT
Start: 2024-07-11 | End: 2024-07-11

## 2024-07-11 RX ORDER — SODIUM CHLORIDE 0.9 % (FLUSH) 0.9 %
2 SYRINGE (ML) INJECTION
Qty: 0 | Refills: 0 | DISCHARGE
Start: 2024-07-11

## 2024-07-11 RX ORDER — ONDANSETRON HYDROCHLORIDE 2 MG/ML
4 INJECTION INTRAMUSCULAR; INTRAVENOUS EVERY 8 HOURS
Refills: 0 | Status: DISCONTINUED | OUTPATIENT
Start: 2024-07-11 | End: 2024-07-11

## 2024-07-11 RX ADMIN — Medication 0.1 MILLIGRAM(S): at 02:58

## 2024-07-11 RX ADMIN — Medication 0.1 MILLIGRAM(S): at 06:22

## 2024-07-11 RX ADMIN — OXYCODONE HYDROCHLORIDE 5 MILLIGRAM(S): 100 SOLUTION ORAL at 02:58

## 2024-07-11 RX ADMIN — POTASSIUM CHLORIDE 40 MILLIEQUIVALENT(S): 600 TABLET, FILM COATED, EXTENDED RELEASE ORAL at 10:08

## 2024-07-11 RX ADMIN — Medication 3 GRAM(S): at 13:16

## 2024-07-11 RX ADMIN — ONDANSETRON HYDROCHLORIDE 4 MILLIGRAM(S): 2 INJECTION INTRAMUSCULAR; INTRAVENOUS at 06:17

## 2024-07-11 RX ADMIN — Medication 650 MILLIGRAM(S): at 07:17

## 2024-07-11 RX ADMIN — Medication 650 MILLIGRAM(S): at 06:17

## 2024-07-11 RX ADMIN — Medication 3 GRAM(S): at 06:22

## 2024-07-11 RX ADMIN — OXYCODONE HYDROCHLORIDE 5 MILLIGRAM(S): 100 SOLUTION ORAL at 03:58

## 2024-07-11 RX ADMIN — Medication 40 MILLIGRAM(S): at 06:18

## 2024-07-11 RX ADMIN — Medication 3 GRAM(S): at 02:58

## 2024-07-11 RX ADMIN — Medication 2 CAPSULE(S): at 15:41

## 2024-07-11 NOTE — DISCHARGE NOTE PROVIDER - ATTENDING DISCHARGE PHYSICAL EXAMINATION:
Patient was seen and examined at bedside on 7/11/2024 at 2 pm with son present. Patient reports feeling improved and back at baseline, swelling has resolved. Denies fevers, headaches, vision changes, abdominal pain, CP, SOB. ROS is otherwise negative. Vitals, labwork and pertinent imaging reviewed. Physical exam - NAD, AAO x 4, PERRLA, EOMI, supple neck, chest - CTA b/l, CV - rrr, s1s2, no m/r/g, abd - soft, + BS, NTND, ext - wwp, no edema, psych - normal affect, skin - no rash    Plan  -Patient's presenting symptoms of b/l LE edema have resolved  -Will start Nifedipine 30 mg PO qd given elevated BP; pt will need to continue monitoring her BP at home with instructions to obtain a BP cuff (she reports that she is able to get one) and ensure SBP < 160 (as per NSG recommendations) and should likely have SBP < 140  -Patient was evaluated by Dr. Patton (vascular cardiology) recommends that patient be discharged without AC and will have close follow up in his office within 1 week where an ultrasound will be repeated  -Start to taper salt tabs given normal Na (and they could be contributing to her HTN and previous edema on admission, c/w Florinef  -Patient will need to have close outpatient follow up (within 2 - 5 days) to titrate antihypertensives, and salt tabs/Florinef  -Patient is medically ready for d/c

## 2024-07-11 NOTE — DISCHARGE NOTE PROVIDER - HOSPITAL COURSE
#Discharge: do not delete    Patient is a 45 yo F with PMHx of SAH 2/2 to newfound intracranial aneurysm, gasritis, and anemia presented with bilateral lower extremity edema found to have hypertensive urgency and DVT.    Hospital course: Patient presented to the ED on 7/10 after noticing bilateral lower extremity swelling that began on 7/9 evening and persisted into the next day. Her legs were painless, non-erythematous, normal color, normal sensation, and had strong pulses bilaterally. She recently was treated in this hospital after developing SAH 2/2 to intracranial aneurysm which is to undergo coiling on 7/24. She underwent CT Head which neurosurgery stated there are no acute changes from prior imaging. She also underwent doppler ultrasound of the lower extremities which showed small DVT in the right calf. Anticoagulation was approved by neurosurgery but deferred by the medicine team after discussion of pros and cons of surveillance with the hematology team. On morning of discharge (7/11) her bilateral LE swelling had entirely resolved, and she was slightly hypertensive but remained asymptomatic. Cardiology came to see her regarding anti-coagulation planning and outpatient surveillance and recommended ______.     Patient was discharged to: home    New medications: _____  Changes to old medications:  Medications that were stopped: salt tabs    Items to follow up as outpatient: DVT surveillance with ultrasound    Physical exam at the time of discharge:  Constitutional: resting comfortably in bed; NAD  Head: NC/AT  Eyes: PERRL, EOMI, anicteric sclera  ENT: no nasal discharge; MMM  Neck: supple; no JVD or thyromegaly  Respiratory: CTA B/L; no W/R/R, no retractions  Cardiac: +S1/S2; RRR; no M/R/G  Gastrointestinal: soft, NT/ND; no rebound or guarding; +BSx4. umbilical hernia on palpation.  Back: spine midline, no bony tenderness or step-offs; no CVAT B/L  Extremities: WWP, no clubbing or cyanosis; no peripheral edema. Capillary refill <2 sec  Musculoskeletal: NROM x4; no joint swelling, tenderness or erythema. 5/5 strength LEs bilaterally.   Vascular: 2+ radial, DP/PT pulses B/L. No evidence of bilateral LE edema.  Dermatologic: skin warm, dry and intact; no rashes, wounds, or scars  Lymphatic: no submandibular or cervical LAD  Neurologic: AAOx3; CNII-XII grossly intact; no focal deficits  Psychiatric: affect and characteristics of appearance, verbalizations, behaviors are appropriate   #Discharge: do not delete    Patient is a 43 yo F with PMHx of SAH 2/2 to newfound intracranial aneurysm, gasritis, and anemia presented with bilateral lower extremity edema found to have hypertensive urgency and DVT.    Hospital course: Patient presented to the ED on 7/10 after noticing bilateral lower extremity swelling that began on 7/9 evening and persisted into the next day. Her legs were painless, non-erythematous, normal color, normal sensation, and had strong pulses bilaterally. She recently was treated in this hospital after developing SAH 2/2 to intracranial aneurysm which is to undergo coiling on 7/24. She underwent CT Head which neurosurgery stated there are no acute changes from prior imaging. She also underwent doppler ultrasound of the lower extremities which showed small DVT in the right calf. Anticoagulation was approved by neurosurgery but deferred by the medicine team after discussion of pros and cons of surveillance with the hematology team. On morning of discharge (7/11) her bilateral LE swelling had entirely resolved, and she was slightly hypertensive but remained asymptomatic. Cardiology came to see her regarding anti-coagulation planning and recommended outpatient surveillance.     Patient was discharged to: home    New medications: nifedipine, blood pressure cuff  Changes to old medications:  Medications that were stopped: salt tabs    Items to follow up as outpatient: DVT surveillance with ultrasound    Physical exam at the time of discharge:  Constitutional: resting comfortably in bed; NAD  Head: NC/AT  Eyes: PERRL, EOMI, anicteric sclera  ENT: no nasal discharge; MMM  Neck: supple; no JVD or thyromegaly  Respiratory: CTA B/L; no W/R/R, no retractions  Cardiac: +S1/S2; RRR; no M/R/G  Gastrointestinal: soft, NT/ND; no rebound or guarding; +BSx4. umbilical hernia on palpation.  Back: spine midline, no bony tenderness or step-offs; no CVAT B/L  Extremities: WWP, no clubbing or cyanosis; no peripheral edema. Capillary refill <2 sec  Musculoskeletal: NROM x4; no joint swelling, tenderness or erythema. 5/5 strength LEs bilaterally.   Vascular: 2+ radial, DP/PT pulses B/L. No evidence of bilateral LE edema.  Dermatologic: skin warm, dry and intact; no rashes, wounds, or scars  Lymphatic: no submandibular or cervical LAD  Neurologic: AAOx3; CNII-XII grossly intact; no focal deficits  Psychiatric: affect and characteristics of appearance, verbalizations, behaviors are appropriate   #Discharge: do not delete    Patient is a 45 yo F with PMHx of SAH 2/2 to newfound intracranial aneurysm, gasritis, and anemia presented with bilateral lower extremity edema found to have hypertensive urgency and DVT.    Hospital course: Patient presented to the ED on 7/10 after noticing bilateral lower extremity swelling that began on 7/9 evening and persisted into the next day. Her legs were painless, non-erythematous, normal color, normal sensation, and had strong pulses bilaterally. She recently was treated in this hospital after developing SAH 2/2 to intracranial aneurysm which is to undergo coiling on 7/24. She underwent CT Head which neurosurgery stated there are no acute changes from prior imaging. She also underwent doppler ultrasound of the lower extremities which showed small DVT in the right calf. Anticoagulation was approved by neurosurgery but deferred by the medicine team after discussion of pros and cons of surveillance with the hematology team. On morning of discharge (7/11) her bilateral LE swelling had entirely resolved, and she was slightly hypertensive but remained asymptomatic. Cardiology came to see her regarding anti-coagulation planning and recommended outpatient surveillance.     Patient was discharged to: home    New medications: nifedipine, blood pressure cuff, fioricet (can get OTC)  Changes to old medications:  Medications that were stopped: salt tabs    Items to follow up as outpatient: DVT surveillance with ultrasound    Physical exam at the time of discharge:  Constitutional: resting comfortably in bed; NAD  Head: NC/AT  Eyes: PERRL, EOMI, anicteric sclera  ENT: no nasal discharge; MMM  Neck: supple; no JVD or thyromegaly  Respiratory: CTA B/L; no W/R/R, no retractions  Cardiac: +S1/S2; RRR; no M/R/G  Gastrointestinal: soft, NT/ND; no rebound or guarding; +BSx4. umbilical hernia on palpation.  Back: spine midline, no bony tenderness or step-offs; no CVAT B/L  Extremities: WWP, no clubbing or cyanosis; no peripheral edema. Capillary refill <2 sec  Musculoskeletal: NROM x4; no joint swelling, tenderness or erythema. 5/5 strength LEs bilaterally.   Vascular: 2+ radial, DP/PT pulses B/L. No evidence of bilateral LE edema.  Dermatologic: skin warm, dry and intact; no rashes, wounds, or scars  Lymphatic: no submandibular or cervical LAD  Neurologic: AAOx3; CNII-XII grossly intact; no focal deficits  Psychiatric: affect and characteristics of appearance, verbalizations, behaviors are appropriate   #Discharge: do not delete    Patient is a 43 yo F with PMHx of SAH 2/2 to newfound intracranial aneurysm, gasritis, and anemia presented with bilateral lower extremity edema found to have hypertensive urgency and DVT.    Hospital course: Patient presented to the ED on 7/10 after noticing bilateral lower extremity swelling that began on 7/9 evening and persisted into the next day. Her legs were painless, non-erythematous, normal color, normal sensation, and had strong pulses bilaterally. She recently was treated in this hospital after developing SAH 2/2 to intracranial aneurysm which is to undergo coiling on 7/24. She underwent CT Head which neurosurgery stated there are no acute changes from prior imaging. She also underwent doppler ultrasound of the lower extremities which showed small DVT in the right calf. Anticoagulation was approved by neurosurgery but deferred by the medicine team after discussion of pros and cons of surveillance with the hematology team. On morning of discharge (7/11) her bilateral LE swelling had entirely resolved, and she was slightly hypertensive but remained asymptomatic. Cardiology came to see her regarding anti-coagulation planning and recommended outpatient surveillance.     Problem/Plan - 1:  ·  Problem: SAH (subarachnoid hemorrhage).   ·  Plan: #Headache  Pt with was transferred from [place] and admitted at the end of June for SAH 2/2 newly discovered intracranial aneurysm. She initially presented for severe headache, which has improved, but she still has residual headache and photophobia. She is scheduled for coiling of her brain aneurysm on 7/24/24, hasn't had intervention yet.  CTH this admission with  Subtle increased attenuation appreciated on the left within the region of the brachium pontis. It is unclear if this represents artifact or residual hemorrhage. NSGY consulted  in ED. They are not concerned about hyperattenuation. SAH stable per NSGY. Recommend no intervention at present.    - F/u NSGY recs  - Monitor neuro status and headache. with low threshold for escalation of care  - Pain control:           > Tylenol          > Oxy 5 Q4 prn for severe pain  - Neuro checks Q6  - Ensure follow-up with NSGY at discharge.     Problem/Plan - 2:  ·  Problem: Hypertensive urgency.   ·  Plan: Pt with BP spike to 180/109 s/p hydral 10 in ED with imporvement to /97. No signs of end-organ damage. Reports headache, but is subacute 2/2 SAH.    - Monitor BP  - Hydral prn.     Problem/Plan - 3:  ·  Problem: DVT, lower extremity.   ·  Plan: #LE edema  Pt with bilateral LE liat worse on L was found to have DVT in right calf. unclear chronicity and unclear if dVT is the actual etiology of edema, as symptoms are bilateral and worse on side without DVT. WWP with good pulses and no skin olor changes. Low suspicion for compartment syndrome. Per NSGY there is no contraindication to AC. Heme consulted, who recommended risk/benefit discussion of given recent SAH with possibility of interval surveillance of DVT. We favor this approach.    - No AC for now  - Monitor symptoms  - Outpt follow-up for interval surveillance of DVT    Patient was discharged to: home    New medications: nifedipine, blood pressure cuff, fioricet (can get OTC)  Changes to old medications:  Medications that were stopped: salt tabs    Items to follow up as outpatient: DVT surveillance with ultrasound    Physical exam at the time of discharge:  Constitutional: resting comfortably in bed; NAD  Head: NC/AT  Eyes: PERRL, EOMI, anicteric sclera  ENT: no nasal discharge; MMM  Neck: supple; no JVD or thyromegaly  Respiratory: CTA B/L; no W/R/R, no retractions  Cardiac: +S1/S2; RRR; no M/R/G  Gastrointestinal: soft, NT/ND; no rebound or guarding; +BSx4. umbilical hernia on palpation.  Back: spine midline, no bony tenderness or step-offs; no CVAT B/L  Extremities: WWP, no clubbing or cyanosis; no peripheral edema. Capillary refill <2 sec  Musculoskeletal: NROM x4; no joint swelling, tenderness or erythema. 5/5 strength LEs bilaterally.   Vascular: 2+ radial, DP/PT pulses B/L. No evidence of bilateral LE edema.  Dermatologic: skin warm, dry and intact; no rashes, wounds, or scars  Lymphatic: no submandibular or cervical LAD  Neurologic: AAOx3; CNII-XII grossly intact; no focal deficits  Psychiatric: affect and characteristics of appearance, verbalizations, behaviors are appropriate

## 2024-07-11 NOTE — DISCHARGE NOTE NURSING/CASE MANAGEMENT/SOCIAL WORK - PATIENT PORTAL LINK FT
You can access the FollowMyHealth Patient Portal offered by [facility] by registering at the following website: [email or web address]. By joining [employer]’s FollowMyHealth portal, you will also be able to view your health information using other applications (apps) compatible with our system.

## 2024-07-11 NOTE — DISCHARGE NOTE PROVIDER - DISCHARGE DATE
"Jaimie Gillis" Tito was seen and treated in our emergency department on 2/20/2023.  She may return to work on 02/22/2023.       If you have any questions or concerns, please don't hesitate to call.      Candis Denton RN    " 11-Jul-2024

## 2024-07-11 NOTE — PROGRESS NOTE ADULT - PROBLEM SELECTOR PLAN 1
#Headache  Pt with was transferred from [place] and admitted at the end of June for SAH 2/2 newly discovered intracranial aneurysm. She initially presented for severe headache, which has improved, but she still has residual headache and photophobia. She is scheduled for coiling of her brain aneurysm on 7/24/24, hasn't had intervention yet.  CTH this admission with  Subtle increased attenuation appreciated on the left within the region of the brachium pontis. It is unclear if this represents artifact or residual hemorrhage. NSGY consulted  in ED. They are not concerned about hyperattenuation. SAH stable per NSGY. Recommend no intervention at present.    - F/u NSGY recs  - Monitor neuro status and headache. with low threshold for escalation of care  - Pain control:           > Tylenol          > Oxy 5 Q4 prn for severe pain  - Neuro checks Q6  - Ensure follow-up with NSGY at discharge

## 2024-07-11 NOTE — DISCHARGE NOTE PROVIDER - NSDCMRMEDTOKEN_GEN_ALL_CORE_FT
acetaminophen 325 mg oral tablet: 2 tab(s) orally every 6 hours As needed Temp greater or equal to 38.5C (101.3F), Mild Pain (1 - 3)  butalbital/acetaminophen/caffeine 50 mg-300 mg-40 mg oral capsule: 2 cap(s) orally every 8 hours As needed Headache  melatonin 3 mg oral tablet: 1 tab(s) orally once a day (at bedtime) As needed Insomnia  oxyCODONE 5 mg oral tablet: 1 tab(s) orally every 4 hours As needed Severe Pain (7 - 10)  Pepcid 40 mg oral tablet: 1 tab(s) orally   blood pressure cuff: monitor blood pressure, goal &lt;160 per neurosurgery  butalbital/acetaminophen/caffeine 50 mg-300 mg-40 mg oral capsule: 2 cap(s) orally every 8 hours As needed Headache  famotidine 40 mg oral tablet: 1 tab(s) orally every 24 hours  fludrocortisone 0.1 mg oral tablet: 1 tab(s) orally every 12 hours  melatonin 3 mg oral tablet: 1 tab(s) orally once a day (at bedtime) As needed Insomnia  NIFEdipine 30 mg oral tablet, extended release: 1 tab(s) orally once a day   blood pressure cuff: monitor blood pressure, goal &lt;160 per neurosurgery  butalbital/acetaminophen/caffeine 50 mg-300 mg-40 mg oral capsule: 2 cap(s) orally every 8 hours As needed Headache  famotidine 40 mg oral tablet: 1 tab(s) orally every 24 hours  fludrocortisone 0.1 mg oral tablet: 1 tab(s) orally every 12 hours  melatonin 3 mg oral tablet: 1 tab(s) orally once a day (at bedtime) As needed Insomnia  NIFEdipine 30 mg oral tablet, extended release: 1 tab(s) orally once a day  sodium chloride 1 g oral tablet: 2 tab(s) orally every 8 hours

## 2024-07-11 NOTE — DISCHARGE NOTE PROVIDER - CARE PROVIDER_API CALL
Mary Jo Patton  Cardiovascular Disease  [address] [address] [place] [address], [address] [place] [address]  Phone: [phone]  Fax: [phone]  Follow Up Time: 1 week

## 2024-07-11 NOTE — CONSULT NOTE ADULT - SUBJECTIVE AND OBJECTIVE BOX
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 Patient reports mild headaches that improve with Percocet. Denies worsening headaches, blurry vision, dizziness, new weakness, seizure activity, syncope, nausea, vomiting, diarrhea, abdominal pain. Patient found to have R IM calf DVT on LE dopplers. Neurosurgery consulted for AC clearance i/s/o recent non-traumatic SAH.    PAST MEDICAL & SURGICAL HISTORY:  Gastritis      Asthma      Anemia, unspecified        FAMILY HISTORY:      SOCIAL HISTORY:  Tobacco Use:  EtOH use:   Substance:    Allergies    Allergy Status Unknown    Intolerances        REVIEW OF SYSTEMS per HPI      MEDICATIONS:  Antibiotics:    Neuro:    Anticoagulation:    OTHER:  famotidine Injectable 20 milliGRAM(s) IV Push once    IVF:      Vital Signs Last 24 Hrs  T(C): 36.8 (10 Jul 2024 15:10), Max: 37.2 (10 Jul 2024 13:18)  T(F): 98.3 (10 Jul 2024 15:10), Max: 98.9 (10 Jul 2024 13:18)  HR: 96 (10 Jul 2024 15:10) (93 - 96)  BP: 159/91 (10 Jul 2024 15:10) (150/88 - 159/91)  BP(mean): --  RR: 18 (10 Jul 2024 15:10) (18 - 18)  SpO2: 100% (10 Jul 2024 15:10) (98% - 100%)    Parameters below as of 10 Jul 2024 15:10  Patient On (Oxygen Delivery Method): room air        PHYSICAL EXAM:  Constitutional: Patient is resting comfortably in stretcher, in no acute distress.  Respiratory: breathing non-labored, symmetrical chest wall movement  Cardiovascuar: RRR, no murmurs  Gastrointestinal: abdomen soft, non tender  Genitourinary: exam deffered  Neurological:  AAOX3. Verbal function intact  Cranial Nerves: II-XII intact  Motor: 5/5 power in b/l UE and LE  Sensation: intact to light touch in all extremities  Pronator Drift: none.      LABS:                        9.9    8.18  )-----------( 201      ( 10 Jul 2024 14:18 )             30.5     07-10    135  |  103  |  9   ----------------------------<  98  4.1   |  25  |  0.90    Ca    8.7      10 Jul 2024 14:18    TPro  7.0  /  Alb  3.9  /  TBili  <0.2  /  DBili  x   /  AST  26  /  ALT  55<H>  /  AlkPhos  75  07-10      Urinalysis Basic - ( 10 Jul 2024 14:18 )    Color: x / Appearance: x / SG: x / pH: x  Gluc: 98 mg/dL / Ketone: x  / Bili: x / Urobili: x   Blood: x / Protein: x / Nitrite: x   Leuk Esterase: x / RBC: x / WBC x   Sq Epi: x / Non Sq Epi: x / Bacteria: x      CULTURES:      RADIOLOGY & ADDITIONAL STUDIES:  < from: US Duplex Venous Lower Ext Complete, Bilateral (07.10.24 @ 17:34) >  IMPRESSION:    Deep venous thrombus is in a muscular branch in the right calf.    No evidence of deep venous thrombosis in the left lower extremity.    The findings were discussed with Dr. Garner on 7/10/2024 5:52 PM    --- End of Report ---            KERI ROY MD; Attending Radiologist  This document has been electronically signed. Jul 10 2024  5:53PM    < end of copied text >  
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    PMHx/PSHx: as above  FMHx/Social hx: none contributory    Current Medications:   acetaminophen     Tablet .. 650 milliGRAM(s) Oral every 8 hours PRN  acetaminophen 300 mG/butalbital 50 mG/ caffeine 40 mG 2 Capsule(s) Oral every 8 hours PRN  aluminum hydroxide/magnesium hydroxide/simethicone Suspension 30 milliLiter(s) Oral every 4 hours PRN  famotidine    Tablet 40 milliGRAM(s) Oral every 24 hours  fludroCORTISONE 0.1 milliGRAM(s) Oral every 12 hours  melatonin 3 milliGRAM(s) Oral at bedtime PRN  ondansetron Injectable 4 milliGRAM(s) IV Push every 8 hours PRN  oxyCODONE    IR 5 milliGRAM(s) Oral every 4 hours PRN  sodium chloride 3 Gram(s) Oral every 6 hours      REVIEW OF SYSTEMS:  CONSTITUTIONAL: No weakness, fevers or chills  EYES/ENT: No visual changes;  No dysphagia  NECK: No pain or stiffness  RESPIRATORY: No cough, wheezing, hemoptysis; No shortness of breath  CARDIOVASCULAR: No chest pain or palpitations; No lower extremity edema  GASTROINTESTINAL: No abdominal or epigastric pain. No nausea, vomiting, or hematemesis; No diarrhea or constipation. No melena or hematochezia.  BACK: No back pain  GENITOURINARY: No dysuria, frequency or hematuria  NEUROLOGICAL: No numbness or weakness  SKIN: No itching, burning, rashes, or lesions   All other review of systems is negative unless indicated above.    Physical Exam:  T(F): 98.2 (07-11), Max: 98.5 (07-11)  HR: 88 (07-11) (60 - 99)  BP: 167/108 (07-11) (94/62 - 183/109)  BP(mean): --  ABP: --  ABP(mean): --  RR: 17 (07-11)  SpO2: 99% (07-11)  GENERAL: No acute distress, well-developed  HEAD:  Atraumatic, Normocephalic  ENT: EOMI, PERRLA, conjunctiva and sclera clear, Neck supple, No JVD, moist mucosa  CHEST/LUNG: Clear to auscultation bilaterally; No wheeze, equal breath sounds bilaterally   BACK: No spinal tenderness  HEART: Regular rate and rhythm; No murmurs, rubs, or gallops  ABDOMEN: Soft, Nontender, Nondistended; Bowel sounds present  EXTREMITIES:  No clubbing, cyanosis, or edema  PSYCH: Nl behavior, nl affect  NEUROLOGY: AAOx3, non-focal, cranial nerves intact  SKIN: Normal color, No rashes or lesions      Cardiovascular Diagnostic Testing: personally reviewed    CXR: Personally reviewed    Labs: Personally reviewed                        9.6    8.93  )-----------( 219      ( 11 Jul 2024 05:30 )             30.7     07-11    137  |  105  |  5<L>  ----------------------------<  125<H>  3.2<L>   |  23  |  0.62    Ca    8.7      11 Jul 2024 05:30  Phos  3.2     07-11  Mg     1.8     07-11    TPro  6.2  /  Alb  3.6  /  TBili  <0.2  /  DBili  x   /  AST  20  /  ALT  45  /  AlkPhos  76  07-11    PT/INR - ( 11 Jul 2024 05:30 )   PT: 10.6 sec;   INR: 0.93          PTT - ( 11 Jul 2024 05:30 )  PTT:29.5 sec    CARDIAC MARKERS ( 05 Jul 2024 10:53 )  <6 ng/L / x     / x     / x     / x     / x

## 2024-07-11 NOTE — DISCHARGE NOTE NURSING/CASE MANAGEMENT/SOCIAL WORK - NSDCPEFALRISK_GEN_ALL_CORE
For information on Fall & Injury Prevention, visit: [email or web address] OR  [email or web address] OR  https://www.cdc.gov/steadi/patient.html

## 2024-07-11 NOTE — DISCHARGE NOTE PROVIDER - NSDCCPCAREPLAN_GEN_ALL_CORE_FT
PRINCIPAL DISCHARGE DIAGNOSIS  Diagnosis: Deep vein thrombosis (DVT)  Assessment and Plan of Treatment: You were found to have a DVT during this hospital admission. Deep vein thrombosis (DVT) is a serious condition that occurs when a blood clot forms in a vein located deep inside your body. A blood clot is a clump of blood that's turned to a solid state. Deep vein blood clots typically form in your thigh or lower leg, but they can also develop in other areas of your body. You were prescribed ____ which is a blood thinner. Please continue to take your blood thinner and follow up with your primary care doctor to further manage this condition.      SECONDARY DISCHARGE DIAGNOSES  Diagnosis: Hypertension  Assessment and Plan of Treatment: Hypertension is the medical term for high blood pressure. Blood pressure refers to the pressure that blood applies to the inner walls of the arteries. Arteries carry blood from the heart to other organs and parts of the body. Untreated high blood pressure increases the strain on the heart and arteries, eventually causing organ damage. High blood pressure increases the risk of heart failure, heart attack (myocardial infarction), stroke, and kidney failure. High blood pressure does not usually cause any symptoms. Treatment of hypertension usually begins with lifestyle changes. Making these lifestyle changes involves little or no risk. Recommended changes often include reducing the amount of salt in your diet, losing weight if you are overweight or obese, avoiding drinking too much alcohol, stopping smoking and exercising at least 30 minutes per day most days of the week. If you are prescribed medication for your hypertension it is important to take these as prescribed to prevent the possible complications of uncontrolled hypertension.     PRINCIPAL DISCHARGE DIAGNOSIS  Diagnosis: Deep vein thrombosis (DVT)  Assessment and Plan of Treatment: You were found to have a DVT during this hospital admission. Deep vein thrombosis (DVT) is a serious condition that occurs when a blood clot forms in a vein located deep inside your body. A blood clot is a clump of blood that's turned to a solid state. Deep vein blood clots typically form in your thigh or lower leg, but they can also develop in other areas of your body. You were prescribed ____ which is a blood thinner. Please continue to take your blood thinner and follow up with your primary care doctor to further manage this condition.      SECONDARY DISCHARGE DIAGNOSES  Diagnosis: SAH (subarachnoid hemorrhage)  Assessment and Plan of Treatment:     Diagnosis: Hypertensive urgency  Assessment and Plan of Treatment:     Diagnosis: Hypertension  Assessment and Plan of Treatment: Hypertension is the medical term for high blood pressure. Blood pressure refers to the pressure that blood applies to the inner walls of the arteries. Arteries carry blood from the heart to other organs and parts of the body. Untreated high blood pressure increases the strain on the heart and arteries, eventually causing organ damage. High blood pressure increases the risk of heart failure, heart attack (myocardial infarction), stroke, and kidney failure. High blood pressure does not usually cause any symptoms. Treatment of hypertension usually begins with lifestyle changes. Making these lifestyle changes involves little or no risk. Recommended changes often include reducing the amount of salt in your diet, losing weight if you are overweight or obese, avoiding drinking too much alcohol, stopping smoking and exercising at least 30 minutes per day most days of the week. If you are prescribed medication for your hypertension it is important to take these as prescribed to prevent the possible complications of uncontrolled hypertension.

## 2024-07-11 NOTE — CONSULT NOTE ADULT - ASSESSMENT
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    Assessment  1. Provoked below the knee DVT  New R soleal vein DVT, no proximal extension  Provoked by recent hospitalization/immobility  2. Recently diagnosed SAH and R PComm/L Ant choroidal aneurysm    Plan  1. Given recent SAH and cerebrovascular aneurysm, would not recommend AC for below the knee DVT w/o proximal extension, especially given no other provocative factor than recent hospitalization  2. Will obtain repeat LE venous duplex US in my vascular office to r/o proximal extension, I will arrange for appt  3. 1st lifetime episode w/ clear provoking factor so no need for hypercoagulability workup  Thank you for the consult and the opportunity to take care of this patient.     Mary Jo Patton M.D.  Cardiology | Vascular Cardiology Attending  Please call (c) [phone] for any questions    During non face-to-face time, I reviewed relevant portions of the patient’s medical record. During face-to-face time, I took a relevant history and examined the patient. I also explained differential diagnoses, relevant cardiac diagnoses, workup, and management plan, which required a mod level of medical decision making. I answered all questions related to the patient's medical conditions.    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    Assessment  1. Provoked below the knee DVT  New R soleal vein DVT, no proximal extension  Provoked by recent hospitalization/immobility  2. 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 HTN    Plan  1. Given recent SAH and cerebrovascular aneurysm, would not recommend AC for below the knee DVT w/o proximal extension, especially given no other provocative factor than recent hospitalization  2. Will obtain repeat LE venous duplex US in my vascular office to r/o proximal extension, I will arrange for appt  3. 1st lifetime episode w/ clear provoking factor so no need for hypercoagulability workup  4. HR <100, SpO2 100% on room air, no cardiopulmonary complaints (she completely denied) clinical concern for PE is low but advised the patient to come to the ED if she develops new chest pain or shortness of breath  5. Recommend starting amlodipine 5mg PO QD for HTN    Thank you for the consult and the opportunity to take care of this patient.     Mary Jo Patton M.D.  Cardiology | Vascular Cardiology Attending  Please call (c) [phone] for any questions    During non face-to-face time, I reviewed relevant portions of the patient’s medical record. During face-to-face time, I took a relevant history and examined the patient. I also explained differential diagnoses, relevant cardiac diagnoses, workup, and management plan, which required a mod level of medical decision making. I answered all questions related to the patient's medical conditions.

## 2024-07-11 NOTE — PROGRESS NOTE ADULT - SUBJECTIVE AND OBJECTIVE BOX
Patient is a 44y old  Female who presents with a chief complaint of Hypertension, VTE (10 Jul 2024 23:44)      HOSPITAL COURSE:     OVERNIGHT EVENTS:    SUBJECTIVE:     ROS: otherwise negative      T(C): 36.8 (07-11-24 @ 05:56), Max: 37.2 (07-10-24 @ 13:18)  HR: 60 (07-11-24 @ 05:56) (60 - 99)  BP: 94/62 (07-11-24 @ 05:56) (94/62 - 183/109)  RR: 16 (07-11-24 @ 05:56) (16 - 18)  SpO2: 99% (07-11-24 @ 05:56) (97% - 100%)  Wt(kg): --Vital Signs Last 24 Hrs  T(C): 36.8 (11 Jul 2024 05:56), Max: 37.2 (10 Jul 2024 13:18)  T(F): 98.3 (11 Jul 2024 05:56), Max: 98.9 (10 Jul 2024 13:18)  HR: 60 (11 Jul 2024 05:56) (60 - 99)  BP: 94/62 (11 Jul 2024 05:56) (94/62 - 183/109)  BP(mean): --  RR: 16 (11 Jul 2024 05:56) (16 - 18)  SpO2: 99% (11 Jul 2024 05:56) (97% - 100%)    Parameters below as of 11 Jul 2024 05:56  Patient On (Oxygen Delivery Method): room air        PHYSICAL EXAM:  Constitutional: resting comfortably in bed; NAD  Head: NC/AT  Eyes: PERRL, EOMI, anicteric sclera  ENT: no nasal discharge; MMM  Neck: supple; no JVD or thyromegaly  Respiratory: CTA B/L; no W/R/R, no retractions  Cardiac: +S1/S2; RRR; no M/R/G  Gastrointestinal: soft, NT/ND; no rebound or guarding; +BSx4  Back: spine midline, no bony tenderness or step-offs; no CVAT B/L  Extremities: WWP, no clubbing or cyanosis; no peripheral edema. Capillary refill <2 sec  Musculoskeletal: NROM x4; no joint swelling, tenderness or erythema  Vascular: 2+ radial, DP/PT pulses B/L  Dermatologic: skin warm, dry and intact; no rashes, wounds, or scars  Lymphatic: no submandibular or cervical LAD  Neurologic: AAOx3; CNII-XII grossly intact; no focal deficits  Psychiatric: affect and characteristics of appearance, verbalizations, behaviors are appropriate    LABS:                        9.9    8.18  )-----------( 201      ( 10 Jul 2024 14:18 )             30.5     07-10    135  |  103  |  9   ----------------------------<  98  4.1   |  25  |  0.90    Ca    8.7      10 Jul 2024 14:18    TPro  7.0  /  Alb  3.9  /  TBili  <0.2  /  DBili  x   /  AST  26  /  ALT  55<H>  /  AlkPhos  75  07-10        Urinalysis Basic - ( 10 Jul 2024 14:18 )    Color: x / Appearance: x / SG: x / pH: x  Gluc: 98 mg/dL / Ketone: x  / Bili: x / Urobili: x   Blood: x / Protein: x / Nitrite: x   Leuk Esterase: x / RBC: x / WBC x   Sq Epi: x / Non Sq Epi: x / Bacteria: x      CAPILLARY BLOOD GLUCOSE            Urinalysis Basic - ( 10 Jul 2024 14:18 )    Color: x / Appearance: x / SG: x / pH: x  Gluc: 98 mg/dL / Ketone: x  / Bili: x / Urobili: x   Blood: x / Protein: x / Nitrite: x   Leuk Esterase: x / RBC: x / WBC x   Sq Epi: x / Non Sq Epi: x / Bacteria: x        MEDICATIONS  (STANDING):  famotidine    Tablet 40 milliGRAM(s) Oral every 24 hours  fludroCORTISONE 0.1 milliGRAM(s) Oral every 12 hours  sodium chloride 3 Gram(s) Oral every 6 hours    MEDICATIONS  (PRN):  acetaminophen     Tablet .. 650 milliGRAM(s) Oral every 6 hours PRN Temp greater or equal to 38C (100.4F), Mild Pain (1 - 3)  aluminum hydroxide/magnesium hydroxide/simethicone Suspension 30 milliLiter(s) Oral every 4 hours PRN Dyspepsia  melatonin 3 milliGRAM(s) Oral at bedtime PRN Insomnia  ondansetron Injectable 4 milliGRAM(s) IV Push every 8 hours PRN Nausea and/or Vomiting  oxyCODONE    IR 5 milliGRAM(s) Oral every 4 hours PRN Severe Pain (7 - 10)      RADIOLOGY & ADDITIONAL TESTS: Reviewed   Patient is a 44y old  Female who presents with a chief complaint of Hypertension, VTE (10 Jul 2024 23:44)      HOSPITAL COURSE: Patient presented to the ED after 1 day of significant bilateral LE swelling without erythema or pain.She denied any chest pain, shortness of breath, tenderness to palpation over legs bilaterally, nausea, or fever. She was hypertensive to 180/109 but asymptomatic and pressures improved s/p administration of hydralazine. There were no changes in sensation or skin color. She had not started any new medications and denied any recent travel. Pregnancy test was negative. Doppler ultrasound showed small R DVT. Patient was recently diagnosed with SAH 2/2 to newly discovered intracranial aneurysm for which she is to undergo coiling on 7/24. CT head showed no changes from prior imaging, confirmed by neurosurgery. She has headache at baseline which is stable from prior, no acute changes. She is being kept off anticoagulants per heme's recommendations although she is clear from neurosurgery's standpoint. The morning after her admission, her bilateral swelling had completely resolved. Started on fioricet given success in the past to help mitigate ongoing headache symptoms.     OVERNIGHT EVENTS: MARTY    SUBJECTIVE: Patient reports ongoing headache at baseline with photophobia but unchanged from prior. Tylenol and oxycodone are slightly helping. Mentioned that fioricet has been helpful for her in the past. She denies any dizziness or lightheadedness. No report of vomiting or fever but she does endorse some nausea which pepcid helped with.     ROS: otherwise negative      T(C): 36.8 (07-11-24 @ 05:56), Max: 37.2 (07-10-24 @ 13:18)  HR: 60 (07-11-24 @ 05:56) (60 - 99)  BP: 94/62 (07-11-24 @ 05:56) (94/62 - 183/109)  RR: 16 (07-11-24 @ 05:56) (16 - 18)  SpO2: 99% (07-11-24 @ 05:56) (97% - 100%)  Wt(kg): --Vital Signs Last 24 Hrs  T(C): 36.8 (11 Jul 2024 05:56), Max: 37.2 (10 Jul 2024 13:18)  T(F): 98.3 (11 Jul 2024 05:56), Max: 98.9 (10 Jul 2024 13:18)  HR: 60 (11 Jul 2024 05:56) (60 - 99)  BP: 94/62 (11 Jul 2024 05:56) (94/62 - 183/109)  BP(mean): --  RR: 16 (11 Jul 2024 05:56) (16 - 18)  SpO2: 99% (11 Jul 2024 05:56) (97% - 100%)    Parameters below as of 11 Jul 2024 05:56  Patient On (Oxygen Delivery Method): room air        PHYSICAL EXAM:  Constitutional: resting comfortably in bed; NAD  Head: NC/AT  Eyes: PERRL, EOMI, anicteric sclera  ENT: no nasal discharge; MMM  Neck: supple; no JVD or thyromegaly  Respiratory: CTA B/L; no W/R/R, no retractions  Cardiac: +S1/S2; RRR; no M/R/G  Gastrointestinal: soft, NT/ND; no rebound or guarding; +BSx4. umbilical hernia on palpation.  Back: spine midline, no bony tenderness or step-offs; no CVAT B/L  Extremities: WWP, no clubbing or cyanosis; no peripheral edema. Capillary refill <2 sec  Musculoskeletal: NROM x4; no joint swelling, tenderness or erythema. 5/5 strength LEs bilaterally.   Vascular: 2+ radial, DP/PT pulses B/L. No evidence of bilateral LE edema.  Dermatologic: skin warm, dry and intact; no rashes, wounds, or scars  Lymphatic: no submandibular or cervical LAD  Neurologic: AAOx3; CNII-XII grossly intact; no focal deficits  Psychiatric: affect and characteristics of appearance, verbalizations, behaviors are appropriate    LABS:                        9.9    8.18  )-----------( 201      ( 10 Jul 2024 14:18 )             30.5     07-10    135  |  103  |  9   ----------------------------<  98  4.1   |  25  |  0.90    Ca    8.7      10 Jul 2024 14:18    TPro  7.0  /  Alb  3.9  /  TBili  <0.2  /  DBili  x   /  AST  26  /  ALT  55<H>  /  AlkPhos  75  07-10        Urinalysis Basic - ( 10 Jul 2024 14:18 )    Color: x / Appearance: x / SG: x / pH: x  Gluc: 98 mg/dL / Ketone: x  / Bili: x / Urobili: x   Blood: x / Protein: x / Nitrite: x   Leuk Esterase: x / RBC: x / WBC x   Sq Epi: x / Non Sq Epi: x / Bacteria: x      CAPILLARY BLOOD GLUCOSE            Urinalysis Basic - ( 10 Jul 2024 14:18 )    Color: x / Appearance: x / SG: x / pH: x  Gluc: 98 mg/dL / Ketone: x  / Bili: x / Urobili: x   Blood: x / Protein: x / Nitrite: x   Leuk Esterase: x / RBC: x / WBC x   Sq Epi: x / Non Sq Epi: x / Bacteria: x        MEDICATIONS  (STANDING):  famotidine    Tablet 40 milliGRAM(s) Oral every 24 hours  fludroCORTISONE 0.1 milliGRAM(s) Oral every 12 hours  sodium chloride 3 Gram(s) Oral every 6 hours    MEDICATIONS  (PRN):  acetaminophen     Tablet .. 650 milliGRAM(s) Oral every 6 hours PRN Temp greater or equal to 38C (100.4F), Mild Pain (1 - 3)  aluminum hydroxide/magnesium hydroxide/simethicone Suspension 30 milliLiter(s) Oral every 4 hours PRN Dyspepsia  melatonin 3 milliGRAM(s) Oral at bedtime PRN Insomnia  ondansetron Injectable 4 milliGRAM(s) IV Push every 8 hours PRN Nausea and/or Vomiting  oxyCODONE    IR 5 milliGRAM(s) Oral every 4 hours PRN Severe Pain (7 - 10)      RADIOLOGY & ADDITIONAL TESTS: Reviewed

## 2024-07-11 NOTE — DISCHARGE NOTE PROVIDER - NSDCCPTREATMENT_GEN_ALL_CORE_FT
PRINCIPAL PROCEDURE  Procedure: Ultrasound, duplex, venous, lower extremity, bilateral  Findings and Treatment: Deep venous thrombus is in a muscular branch in the right calf.  No evidence of deep venous thrombosis in the left lower extremity.

## 2024-07-11 NOTE — DISCHARGE NOTE PROVIDER - NSDCFUSCHEDAPPT_GEN_ALL_CORE_FT
Geovany Quintana  [facility]  NEUROSURG [address]  Scheduled Appointment: 07/15/2024    [facility]  INTMED [address]  Scheduled Appointment: 07/18/2024    Geovany Quintana  [facility]  NEUROSURG BUNN [address]  Scheduled Appointment: 07/24/2024     Geovany Quintana  [facility]  NEUROSURG [address]  Scheduled Appointment: 07/15/2024    [facility]  INTMED [address]  Scheduled Appointment: 07/18/2024    [facility]  HEARTVASC [address]  Scheduled Appointment: 07/23/2024    Mary Jo Pattno  [facility]  HEARTVASC [address]  Scheduled Appointment: 07/23/2024    Geovany Quintana  [facility]  NEUROSURG BUNN [address]  Scheduled Appointment: 07/24/2024

## 2024-07-11 NOTE — DISCHARGE NOTE PROVIDER - NSDCQMERRANDS_GEN_ALL_CORE
-- DO NOT REPLY / DO NOT REPLY ALL --  -- Message is from the [facility]--    Provider paged via [employer] Documentation - The below message was copied and pasted from a [employer] page:    [phone] ACC FROM: ANTONIO GOLDBERG \"PATSY\" DR. DR. DIANNA WHITTEN NAME OF THE PATIENT : ANTONIO TRONCOSO YUSUF  : 1940 CALL BACK NUMBER : [phone] PATIENT IS TAKING WARFARIN 5MG MWF AND 7.5MG REST OF THE WEEK DAYS WITHOUT INR SHE WAS RECOMMENDED TO TAKE ELIQUIS . SHE DECLINED TRIAGE AND REQUESTING FOR AN APPOINTMENT , LAST INR WAS DONE ON 2021 . PLEASE GIVE A CALL BACK . THANK YOU ACC CRISTÓBAL SHELTON .       No

## 2024-07-11 NOTE — PATIENT PROFILE ADULT - NSPROMEDSBROUGHTTOHOSP_GEN_A_NUR
HOME MONITORING REPORT    INR today:   Results for orders placed or performed in visit on 02/22/23   Protime-INR   Result Value Ref Range    INR 1.90        INR Goal: 2.0-3.0    Dosing Plan  As of 2/22/2023      TTR:  31.4 % (4.8 y)   Full warfarin instructions:  2/22: 11.25 mg; 2/26: 11.25 mg; Otherwise 11.25 mg every Sun; 7.5 mg all other days; Starting 2/22/2023                PLAN: Advised patient/caregiver to increase her dose tonight and Sunday night to 11.25 mg. 7.5 mg all other days. Recheck in one week.   Patient/Caregiver voiced understanding yes

## 2024-07-12 PROBLEM — I60.9 SUBARACHNOID HEMORRHAGE, NONTRAUMATIC: Status: ACTIVE | Noted: 2024-07-08

## 2024-07-12 PROBLEM — Z87.19 HISTORY OF GASTRITIS: Status: RESOLVED | Noted: 2024-07-08 | Resolved: 2024-07-12

## 2024-07-12 PROBLEM — I67.1 CEREBRAL ANEURYSM: Status: ACTIVE | Noted: 2024-07-08

## 2024-07-12 PROBLEM — Z87.09 HISTORY OF ASTHMA: Status: RESOLVED | Noted: 2024-07-08 | Resolved: 2024-07-12

## 2024-07-12 PROBLEM — Z86.718 HISTORY OF DVT (DEEP VEIN THROMBOSIS): Status: ACTIVE | Noted: 2024-07-11

## 2024-07-12 PROBLEM — Z86.2 HISTORY OF ANEMIA: Status: RESOLVED | Noted: 2024-07-08 | Resolved: 2024-07-12

## 2024-07-12 NOTE — HISTORY OF PRESENT ILLNESS
[de-identified] : Hospital admission ([facility] on 6/26/24-7/6/24) 45yo F PMHx asthma, anemia, gastritis, presented to [facility] ED today c/o HA x 1 day since 12:30pm yesterday after she got off the bus on her way home from work. Pt reports a history of headaches but noted that this headache was more severe and not responding to Tylenol and rest, reports pain is mostly on the right side of the head and neck. Evaluated in [facility] ED, given IV Reglan, Meclizine, Tylenol for headache, then CTH performed showing frontal area of SAH with CTA showing concern for possible GEORGE aneurysm. additional finding of lower lying cerebellar tonsils. 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 She was discharged to home with outpatient follow up.  7/8/24 visit Today she reports moderately improving headache with Percocet and denies blurry vision, weakness, seizure activity or any other focal neuro deficits. Images reviewed with the patinet which showed wide neck, irregular L anterior choroidal aneurysm. Discussed surgical interveniot of L craniotomy for aneurysm clipping. (tentative surgery date on 7/24/24) [FreeTextEntry1] : returns for recent readmission follow up. She c/o severe b/l LE swelling. US doppler revealed small DVS in R calf (unclear chronicity), cardiology consulted who confirmed no need for anticoagulation tx at this time given recent SAH and recommended outpatient follow up of interval surveillance for DVT.

## 2024-07-15 ENCOUNTER — APPOINTMENT (OUTPATIENT)
Dept: NEUROSURGERY | Facility: CLINIC | Age: 44
End: 2024-07-15
Payer: MEDICAID

## 2024-07-15 VITALS
SYSTOLIC BLOOD PRESSURE: 133 MMHG | DIASTOLIC BLOOD PRESSURE: 83 MMHG | WEIGHT: 146 LBS | OXYGEN SATURATION: 98 % | RESPIRATION RATE: 18 BRPM | BODY MASS INDEX: 28.66 KG/M2 | HEIGHT: 60 IN | HEART RATE: 84 BPM | TEMPERATURE: 97.6 F

## 2024-07-15 DIAGNOSIS — Z87.09 PERSONAL HISTORY OF OTHER DISEASES OF THE RESPIRATORY SYSTEM: ICD-10-CM

## 2024-07-15 DIAGNOSIS — I67.1 CEREBRAL ANEURYSM, NONRUPTURED: ICD-10-CM

## 2024-07-15 DIAGNOSIS — Z86.2 PERSONAL HISTORY OF DISEASES OF THE BLOOD AND BLOOD-FORMING ORGANS AND CERTAIN DISORDERS INVOLVING THE IMMUNE MECHANISM: ICD-10-CM

## 2024-07-15 DIAGNOSIS — Z87.19 PERSONAL HISTORY OF OTHER DISEASES OF THE DIGESTIVE SYSTEM: ICD-10-CM

## 2024-07-15 DIAGNOSIS — I60.9 NONTRAUMATIC SUBARACHNOID HEMORRHAGE, UNSPECIFIED: ICD-10-CM

## 2024-07-15 DIAGNOSIS — Z86.718 PERSONAL HISTORY OF OTHER VENOUS THROMBOSIS AND EMBOLISM: ICD-10-CM

## 2024-07-15 PROCEDURE — 99215 OFFICE O/P EST HI 40 MIN: CPT

## 2024-07-15 NOTE — CHART NOTE - NSCHARTNOTEFT_GEN_A_CORE
Briefly, this is a 44YF PMHx asthma, anemia, gastritis, recent admission for non-traumatic SAH, s/p DSA showing R pcomm infundibulum, L anterior choroidal aneurysm (6/26), s/p DSA showing RCVS (7/2) discharged home on 7/6 presents to the ED for evaluation of bilateral feet swelling since yesterday, denies pain to the feet, chest pain, shortness of breath, palpitations.   US w/ Deep venous thrombus is in a muscular branch in the right calf (confirmed with radiology by the primary team)     Hematology consulted regarding anticoagulation in the setting of recent subarachnoid hemorrhage  -- defer to neurosurgery regarding safety and choice of anticoagulation in the setting of recent SAH and aneurysm clipping  -- choice to anticoagulate should be a shared risk/benefit discussion between patient and primary team, could consider observation with a short interval US follow up if patient is asymptomatic without risks for clot propagation  -- recommend vascular surgery evaluation regarding feasibility and utility of the above.    D/w hematology attending, Dr Aleksandra Baptiste
Patient reports that she feels better since being discharged. Was seen by Dr. Quintana today and was also seen by a nurse at home who reported that her BP was "fine". Patient reports compliance with her medications. She was instructed to keep a log of her blood pressures and to bring it with her to Dr. Patton's office visit on 7/23. During the upcoming visit an ultrasound will also be repeated. Her headache is improved and she has no other symptoms. Return precautions were discussed and all questions were answered.

## 2024-07-18 ENCOUNTER — OUTPATIENT (OUTPATIENT)
Dept: OUTPATIENT SERVICES | Facility: HOSPITAL | Age: 44
LOS: 1 days | End: 2024-07-18
Payer: MEDICAID

## 2024-07-18 ENCOUNTER — NON-APPOINTMENT (OUTPATIENT)
Age: 44
End: 2024-07-18

## 2024-07-18 ENCOUNTER — APPOINTMENT (OUTPATIENT)
Dept: INTERNAL MEDICINE | Facility: CLINIC | Age: 44
End: 2024-07-18
Payer: MEDICAID

## 2024-07-18 VITALS
SYSTOLIC BLOOD PRESSURE: 144 MMHG | RESPIRATION RATE: 16 BRPM | DIASTOLIC BLOOD PRESSURE: 90 MMHG | HEIGHT: 60 IN | WEIGHT: 146 LBS | BODY MASS INDEX: 28.66 KG/M2 | OXYGEN SATURATION: 99 % | TEMPERATURE: 96.9 F | HEART RATE: 88 BPM

## 2024-07-18 DIAGNOSIS — Z01.818 ENCOUNTER FOR OTHER PREPROCEDURAL EXAMINATION: ICD-10-CM

## 2024-07-18 DIAGNOSIS — I16.0 HYPERTENSIVE URGENCY: ICD-10-CM

## 2024-07-18 DIAGNOSIS — I69.098 OTHER SEQUELAE FOLLOWING NONTRAUMATIC SUBARACHNOID HEMORRHAGE: ICD-10-CM

## 2024-07-18 DIAGNOSIS — R51.9 HEADACHE, UNSPECIFIED: ICD-10-CM

## 2024-07-18 DIAGNOSIS — I67.1 CEREBRAL ANEURYSM, NONRUPTURED: ICD-10-CM

## 2024-07-18 DIAGNOSIS — I82.461 ACUTE EMBOLISM AND THROMBOSIS OF RIGHT CALF MUSCULAR VEIN: ICD-10-CM

## 2024-07-18 DIAGNOSIS — K29.70 GASTRITIS, UNSPECIFIED, WITHOUT BLEEDING: ICD-10-CM

## 2024-07-18 DIAGNOSIS — D64.9 ANEMIA, UNSPECIFIED: ICD-10-CM

## 2024-07-18 PROCEDURE — 36415 COLL VENOUS BLD VENIPUNCTURE: CPT

## 2024-07-18 PROCEDURE — 99215 OFFICE O/P EST HI 40 MIN: CPT | Mod: 25

## 2024-07-18 PROCEDURE — 93000 ELECTROCARDIOGRAM COMPLETE: CPT

## 2024-07-18 PROCEDURE — 71045 X-RAY EXAM CHEST 1 VIEW: CPT | Mod: 26

## 2024-07-18 PROCEDURE — 71045 X-RAY EXAM CHEST 1 VIEW: CPT

## 2024-07-18 PROCEDURE — 99496 TRANSJ CARE MGMT HIGH F2F 7D: CPT | Mod: 25

## 2024-07-19 LAB
ALBUMIN SERPL ELPH-MCNC: 4.3 G/DL
ALP BLD-CCNC: 89 U/L
ALT SERPL-CCNC: 27 U/L
ANION GAP SERPL CALC-SCNC: 11 MMOL/L
APTT BLD: 33.4 SEC
AST SERPL-CCNC: 17 U/L
BASOPHILS # BLD AUTO: 0.03 K/UL
BASOPHILS NFR BLD AUTO: 0.5 %
BILIRUB SERPL-MCNC: <0.2 MG/DL
BUN SERPL-MCNC: 13 MG/DL
CALCIUM SERPL-MCNC: 9 MG/DL
CHLORIDE SERPL-SCNC: 101 MMOL/L
CO2 SERPL-SCNC: 24 MMOL/L
CREAT SERPL-MCNC: 0.86 MG/DL
EGFR: 85 ML/MIN/1.73M2
EOSINOPHIL # BLD AUTO: 0.08 K/UL
EOSINOPHIL NFR BLD AUTO: 1.5 %
ESTIMATED AVERAGE GLUCOSE: 105 MG/DL
GLUCOSE SERPL-MCNC: 91 MG/DL
HBA1C MFR BLD HPLC: 5.3 %
HCG SERPL-MCNC: <1 MIU/ML
HCT VFR BLD CALC: 31.4 %
HGB BLD-MCNC: 9.7 G/DL
IMM GRANULOCYTES NFR BLD AUTO: 0.2 %
INR PPP: 0.94 RATIO
LYMPHOCYTES # BLD AUTO: 1.63 K/UL
LYMPHOCYTES NFR BLD AUTO: 29.8 %
MAN DIFF?: NORMAL
MCHC RBC-ENTMCNC: 28 PG
MCHC RBC-ENTMCNC: 30.9 GM/DL
MCV RBC AUTO: 90.8 FL
MONOCYTES # BLD AUTO: 0.56 K/UL
MONOCYTES NFR BLD AUTO: 10.2 %
NEUTROPHILS # BLD AUTO: 3.16 K/UL
NEUTROPHILS NFR BLD AUTO: 57.8 %
PLATELET # BLD AUTO: 277 K/UL
POTASSIUM SERPL-SCNC: 3.8 MMOL/L
PROT SERPL-MCNC: 6.7 G/DL
PT BLD: 10.6 SEC
RBC # BLD: 3.46 M/UL
RBC # FLD: 17.5 %
SODIUM SERPL-SCNC: 136 MMOL/L
WBC # FLD AUTO: 5.47 K/UL

## 2024-07-23 ENCOUNTER — APPOINTMENT (OUTPATIENT)
Dept: HEART AND VASCULAR | Facility: CLINIC | Age: 44
End: 2024-07-23
Payer: MEDICAID

## 2024-07-23 ENCOUNTER — TRANSCRIPTION ENCOUNTER (OUTPATIENT)
Age: 44
End: 2024-07-23

## 2024-07-23 VITALS
SYSTOLIC BLOOD PRESSURE: 137 MMHG | OXYGEN SATURATION: 100 % | WEIGHT: 148 LBS | BODY MASS INDEX: 29.06 KG/M2 | TEMPERATURE: 98 F | HEART RATE: 79 BPM | HEIGHT: 60 IN | DIASTOLIC BLOOD PRESSURE: 86 MMHG

## 2024-07-23 PROCEDURE — 93000 ELECTROCARDIOGRAM COMPLETE: CPT

## 2024-07-23 PROCEDURE — G2211 COMPLEX E/M VISIT ADD ON: CPT | Mod: NC,1L

## 2024-07-23 PROCEDURE — 99214 OFFICE O/P EST MOD 30 MIN: CPT | Mod: 25

## 2024-07-23 RX ORDER — FLUDROCORTISONE ACETATE 0.1 MG/1
0.1 TABLET ORAL
Refills: 0 | Status: ACTIVE | COMMUNITY

## 2024-07-23 RX ORDER — NIFEDIPINE 30 MG/1
30 TABLET, EXTENDED RELEASE ORAL DAILY
Refills: 0 | Status: ACTIVE | COMMUNITY

## 2024-07-23 NOTE — HISTORY OF PRESENT ILLNESS
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  ROS negative for any cardiopulmonary symptoms or LE pain, swelling, or tenderness  PMHx/PSHx as above FMHx no hx of venous thromboembolism Social hx no substance use

## 2024-07-23 NOTE — DISCUSSION/SUMMARY
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  EKG 7/23/24 NSR, left axis deviation  Assessment 1. Provoked below the knee DVT 07/11/24 New R soleal vein DVT, no proximal extension Provoked by recent hospitalization/immobility No clinical evidence of new LE DVT or propagation proximally 2. Recently diagnosed SAH and R PComm/L Ant choroidal aneurysm 3. HTN  Plan 1. Given recent SAH and cerebrovascular aneurysm, would not recommend AC for below the knee DVT w/o proximal extension, especially given no other provocative factor than recent hospitalization 2. Will obtain repeat LE venous duplex US at [facility] tomorrow (was scheduled for today but was cancelled due to vascular tech calling out sick today. Patient will be at the [facility] for surgery w/ Dr. Quintana tomorrow so will need LE duplex US prior to dc, can be done postoperatively, emailed Dr. Quintana) 3. 1st lifetime episode w/ clear provoking factor so no need for hypercoagulability workup 4. HR <100, SpO2 100% on room air, no cardiopulmonary complaints (she completely denied) no clinical concern for PE 5. Since distal DVT, IVC filter not indicated at this moment.  6. I will follow her in the hospital from inpatient [facility] cardiology consult service 7. Cleared to undergo surgery w/o further cardiac workup or intervention otherwise. No concern for ACS, dec HF, severe AS, or tachyarrhythmia w/ METS>4.   During non face-to-face time, I reviewed relevant portions of the patient's medical record. During face-to-face time, I took a relevant history and examined the patient. I also explained differential diagnoses, relevant cardiac diagnoses, workup, and management plan, which required a moderate level of medical decision making. I answered all questions related to the patient's medical conditions.   Mary Jo Patton M.D. Attending Cardiologist [facility]

## 2024-07-23 NOTE — PHYSICAL EXAM
[General Appearance - Well Developed] : well developed [Normal Appearance] : normal appearance [Well Groomed] : well groomed [General Appearance - Well Nourished] : well nourished [No Deformities] : no deformities [General Appearance - In No Acute Distress] : no acute distress [Normal Conjunctiva] : the conjunctiva exhibited no abnormalities [Eyelids - No Xanthelasma] : the eyelids demonstrated no xanthelasmas [Normal Oral Mucosa] : normal oral mucosa [No Oral Pallor] : no oral pallor [No Oral Cyanosis] : no oral cyanosis [Normal Jugular Venous A Waves Present] : normal jugular venous A waves present [Normal Jugular Venous V Waves Present] : normal jugular venous V waves present [No Jugular Venous Calhoun A Waves] : no jugular venous calhoun A waves [Heart Rate And Rhythm] : heart rate and rhythm were normal [Heart Sounds] : normal S1 and S2 [Murmurs] : no murmurs present [Respiration, Rhythm And Depth] : normal respiratory rhythm and effort [Exaggerated Use Of Accessory Muscles For Inspiration] : no accessory muscle use [Auscultation Breath Sounds / Voice Sounds] : lungs were clear to auscultation bilaterally [Abdomen Soft] : soft [Abdomen Tenderness] : non-tender [Abdomen Mass (___ Cm)] : no abdominal mass palpated [Abnormal Walk] : normal gait [Gait - Sufficient For Exercise Testing] : the gait was sufficient for exercise testing [Nail Clubbing] : no clubbing of the fingernails [Cyanosis, Localized] : no localized cyanosis [Petechial Hemorrhages (___cm)] : no petechial hemorrhages [Skin Color & Pigmentation] : normal skin color and pigmentation [] : no rash [No Venous Stasis] : no venous stasis [Skin Lesions] : no skin lesions [No Skin Ulcers] : no skin ulcer [No Xanthoma] : no  xanthoma was observed [Oriented To Time, Place, And Person] : oriented to person, place, and time [Affect] : the affect was normal [Mood] : the mood was normal [No Anxiety] : not feeling anxious

## 2024-07-24 ENCOUNTER — APPOINTMENT (OUTPATIENT)
Dept: NEUROSURGERY | Facility: HOSPITAL | Age: 44
End: 2024-07-24

## 2024-07-25 ENCOUNTER — RESULT REVIEW (OUTPATIENT)
Age: 44
End: 2024-07-25

## 2024-07-26 ENCOUNTER — TRANSCRIPTION ENCOUNTER (OUTPATIENT)
Age: 44
End: 2024-07-26

## 2024-07-27 ENCOUNTER — EMERGENCY (EMERGENCY)
Facility: HOSPITAL | Age: 44
LOS: 1 days | Discharge: HOME | End: 2024-07-27
Attending: STUDENT IN AN ORGANIZED HEALTH CARE EDUCATION/TRAINING PROGRAM | Admitting: STUDENT IN AN ORGANIZED HEALTH CARE EDUCATION/TRAINING PROGRAM
Payer: MEDICAID

## 2024-07-27 VITALS
OXYGEN SATURATION: 100 % | TEMPERATURE: 98 F | RESPIRATION RATE: 20 BRPM | HEART RATE: 75 BPM | DIASTOLIC BLOOD PRESSURE: 74 MMHG | SYSTOLIC BLOOD PRESSURE: 109 MMHG | HEIGHT: 60 IN

## 2024-07-27 DIAGNOSIS — Z98.890 OTHER SPECIFIED POSTPROCEDURAL STATES: Chronic | ICD-10-CM

## 2024-07-27 DIAGNOSIS — Z98.891 HISTORY OF UTERINE SCAR FROM PREVIOUS SURGERY: Chronic | ICD-10-CM

## 2024-07-27 PROCEDURE — 99283 EMERGENCY DEPT VISIT LOW MDM: CPT

## 2024-07-27 PROCEDURE — 99284 EMERGENCY DEPT VISIT MOD MDM: CPT

## 2024-07-27 PROCEDURE — 99282 EMERGENCY DEPT VISIT SF MDM: CPT

## 2024-07-27 NOTE — ED PROVIDER NOTE - CLINICAL SUMMARY MEDICAL DECISION MAKING FREE TEXT BOX
44 year old F presenting with L periorbital swelling, post-op, no orbital cellulitis on exam, EOMI, PEERL, VA intact, not infected, likely 2/2 normal post-op swelling but will get nsgy eval as pt post-op, reassess.

## 2024-07-27 NOTE — ED PROVIDER NOTE - OBJECTIVE STATEMENT
45 yo female, pmhx asthma, anemia, gastritis, non-traumatic SAH and RCVS in 7/2024, recent L crania and clipping of aneursym 7/24 by Dr. Quintana presenting with L periorbital swelling X 1 day. No blurry vision, no pain with eye movement. No fever, chills. No new headache, no numbness, weakness, tingling, no neck pain or neck stiffness. ROS as above.

## 2024-07-27 NOTE — CONSULT NOTE ADULT - ASSESSMENT
45 yo female, pmhx asthma, anemia, gastritis, non-traumatic SAH and RCVS in 7/2024, presents for elective left crani for clipping of aneurysm. Now s/p left pterional crani and clipping of Left ant choroidal aneurysm w intra op angio showing obliteration of aneurysm. (7/24), presents to [facility] ED for left periorbital swelling, no neurologic defecits.     PLAN:   - evaluated in ED, discussed with Dr. Quintana who will plan to see patient in the office on Monday 7/29 @9am   - instructed patient to call [phone] with new concerns, educated on concerns for infection (i.e. fever/chills, increased headaches, wound drainage or redness surrounding the incision)   - advised patient to try and sleep with her head of bed elevated/stacking pillows to elevate her head while sleeping to help with the periorobital swelling, can also intermittently try ice to the left ice as tolerated, rest of medications as is no changes made to post-operative pain regimen   - patient in agreement with plan, ED provider aware     [address]   3rd Floor [facility]   [phone]   Dr. Quintana

## 2024-07-27 NOTE — ED ADULT NURSE NOTE - CAS EDP DISCH TYPE
Contacted patient to schedule follow up appointment for DM Eye Exam. No voicemail, unable to leave a message    Home

## 2024-07-27 NOTE — ED ADULT TRIAGE NOTE - CHIEF COMPLAINT QUOTE
45 y/o female c/o left eye swelling, redness and pain after being discharged from inpatient unit yesterday. Pt had brain aneurism clipping.

## 2024-07-27 NOTE — ED PROVIDER NOTE - CARE PROVIDERS DIRECT ADDRESSES
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 Pt was lethargic and unable to give history. As per sister, pt completed 10 course radiation treatment 1 day PTA. 3 days ago he was complaining of severe headache and also had episode of vomiting. He was seen at oncologist office and was given morphine and valium. The day of admission, the pt developed a fever 0f 103.6F and complained of rigors. He was also noted to be confused.  In ED pt found to have temp of 103.6 rectally. He received vanco IV and cefepime.     1. Febrile syndrome improving.  Acute bacterial meningitis with MSSA. Ommaya site infecton s/p port removal. RUL pneumonia with loculated left pleural effusion ?empyema. Lung CA stage 4 with brain metastasis. Immunocompromised host. Encephalopathy.  -leukocytosis improving  -f/u BC x 2, CSF c/s  -on cefepime 2 gm IV q12h # 3  -tolerating abx well so far; no side effects noted  -continue abx coverage  -hemodinamically stable   -f/u CT head  -respiratory care  -monitor temps  -f/u CBC  -supportive care  2. Other issues:   -care per medicine ,[email or web address]

## 2024-07-27 NOTE — ED PROVIDER NOTE - TOBACCO USE
Medical Necessity Clause: This procedure was medically necessary because the lesions that were treated were: Anesthesia Type: 1% lidocaine with epinephrine Medical Necessity Information: It is in your best interest to select a reason for this procedure from the list below. All of these items fulfill various CMS LCD requirements except the new and changing color options. Detail Level: Detailed Consent: Written consent obtained and the risks of skin tag removal was reviewed with the patient including but not limited to bleeding, pigmentary change, infection, pain, and remote possibility of scarring. Include Z78.9 (Other Specified Conditions Influencing Health Status) As An Associated Diagnosis?: No Anesthesia Volume In Cc: 3 Unknown if ever smoked

## 2024-07-27 NOTE — CONSULT NOTE ADULT - SUBJECTIVE AND OBJECTIVE BOX
CHIEF COMPLAINT/ REASON FOR CONSULTATION: left periorbital edema s/p left crani for aneurysm clipping     HPI: Taken from Discharge summary    "45 yo female, pmhx asthma, anemia, gastritis, non-traumatic SAH and RCVS in 2024, presents for elective left crani for clipping of aneurysm. Now s/p left pterional crani and clipping of Left ant choroidal aneurysm w intra op angio showing obliteration of aneurysm. ()."      :   Pt presents to [facility] ED c/o worsened left periorbital edema after being discharged from the hospital s/p left crani for aneurysm clipping with Dr. Quintana on . Yesterday patient had subgaleal JESSIE drain removed with staples placed, headwrap was removed, pain controlled no issues on discharge. Pt reports that she went home from the hospital yesterday and showered and slept and when she woke up she noticed her left eye was more swollen than before, no neurologic changes noted. This morning when patient woke-up she noticed that her eye was even more swollen and she was having difficulty opening it. Denies fever, chills, increased headache/pain, head trauma, or any neurologic deficits.       PAST MEDICAL HISTORY   Gastritis    Asthma    Anemia, unspecified    HTN (hypertension)      PAST SURGICAL HISTORY   No significant past surgical history    H/O removal of cyst    H/O shoulder surgery    H/O  section      No Known Allergies      MEDICATIONS: see discharge note   Antibiotics:    Neuro:    Anticoagulation:    Other:        FAMILY HISTORY:  No pertinent family history in first degree relatives        REVIEW OF SYSTEMS:  Check here if all are normal other than Neurological [X]  General:  Eyes:  ENT:  Cardiac:  Respiratory:  GI:  Musculoskeletal:   Skin:  Neurologic:   Psychiatric:     PHYSICAL EXAMINATION:   T(C): 36.7 (24 @ 15:12), Max: 36.7 (24 @ 15:12)  HR: 75 (24 @ 15:12) (75 - 75)  BP: 109/74 (24 @ 15:12) (109/74 - 109/74)  RR: 20 (24 @ 15:12) (20 - 20)  SpO2: 100% (24 @ 15:12) (100% - 100%)  Wt(kg): --Height (cm): 152.4 ( @ 15:12)      EXAM:   General: NAD, pt is comfortably sitting up in bed, A&O x3, on RA  HEENT: CN II-XII grossly intact, PERRL 3mm, EOMI b/l, +significant left periorbital edema with some ecchymosis, face symmetric, tongue midline, neck FROM  Cardiovascular: RRR, normal S1 and S2   Respiratory: lungs CTAB, no wheezing, rhonchi, or crackles   GI: normoactive BS to auscultation, abd soft, NTND   Neuro: no aphasia, speech clear, no dysmetria, no pronator drift  strength 5/5 throughout all 4 extremities  sensation intact to light touch throughout   Extremities: distal pulses 2+ x4   Wound/incision: left cranial incision with staples C/D/I, no area of erythema/dehiscence/or drainage noted       LABS:                        12.4   12.87 )-----------( 245      ( 2024 05:30 )             37.0     -    136  |  101  |  17  ----------------------------<  110<H>  3.7   |  22  |  0.85    Ca    9.4      2024 05:30  Phos  3.3       Mg     2.0             Urinalysis Basic - ( 2024 05:30 )    Color: x / Appearance: x / SG: x / pH: x  Gluc: 110 mg/dL / Ketone: x  / Bili: x / Urobili: x   Blood: x / Protein: x / Nitrite: x   Leuk Esterase: x / RBC: x / WBC x   Sq Epi: x / Non Sq Epi: x / Bacteria: x        RADIOLOGY & ADDITIONAL STUDIES:

## 2024-07-27 NOTE — ED PROVIDER NOTE - NS ED ROS FT
Constitutional: No fever or chills  Eyes: + eye swelling  Ears, Nose, Mouth, Throat: No nasal discharge, no sore throat  Cardiovascular: No chest pain, no palpitations  Respiratory: No shortness of breath, no cough  Gastrointestinal: No nausea or vomiting, no abdominal pain, no diarrhea or constipation  Musculoskeletal: No joint pain, no swelling  Skin: No rashes or lesions  Neurological: No numbness, weakness, tingling, no headache  Psychiatric: No depression

## 2024-07-27 NOTE — ED ADULT NURSE NOTE - OBJECTIVE STATEMENT
44y Female A&ox3 to ED c/o left eye erythema and edema. s/p brain aneurism clipping. Noted staples to left surgical site in place. NO s/s of infection. Ambulates with steady gait.

## 2024-07-27 NOTE — ED PROVIDER NOTE - PATIENT PORTAL LINK FT
You can access the FollowMyHealth Patient Portal offered by [facility] by registering at the following website: [email or web address]. By joining [facility]’s FollowMyHealth portal, you will also be able to view your health information using other applications (apps) compatible with our system.

## 2024-07-27 NOTE — ED PROVIDER NOTE - PHYSICAL EXAMINATION
general: Well appearing, in no acute distress  HEENT: L periorbital swelling, extraocular movements intact, PEERL  CV: Regular rate  Pulm: No respiratory distress, no tachypnea  Abd: Flat, no gross distension  Ext: warm and well perfused  Skin: No gross rashes or lesions  Neuro: Alert and oriented, moving all extremities

## 2024-07-27 NOTE — ED PROVIDER NOTE - NSFOLLOWUPINSTRUCTIONS_ED_ALL_ED_FT
Ice the area. Take tylenol as needed. Sleep propped up with pillows, try to avoid sleeping flat. Return for worsening symptoms, worsening pain, fever, chills, blurry vision, pain with eye movement. Otherwise, please see Dr. Quintana on Monday.     I hope you feel better soon!    Sincerely,  Dmaien Goodman MD

## 2024-07-27 NOTE — ED PROVIDER NOTE - CARE PROVIDER_API CALL
Geovany Quintana.  Neurosurgery  [address] [facility] [place] [address]  Phone: [phone]  Fax: [phone]  Scheduled Appointment: 07/29/2024 09:00 AM

## 2024-07-27 NOTE — ED ADULT NURSE NOTE - NSFALLUNIVINTERV_ED_ALL_ED
Bed/Stretcher in lowest position, wheels locked, appropriate side rails in place/Call bell, personal items and telephone in reach/Instruct patient to call for assistance before getting out of bed/chair/stretcher/Non-slip footwear applied when patient is off stretcher/Zachary to call system/Physically safe environment - no spills, clutter or unnecessary equipment/Purposeful proactive rounding/Room/bathroom lighting operational, light cord in reach

## 2024-07-29 ENCOUNTER — APPOINTMENT (OUTPATIENT)
Dept: NEUROSURGERY | Facility: CLINIC | Age: 44
End: 2024-07-29

## 2024-07-29 VITALS
SYSTOLIC BLOOD PRESSURE: 106 MMHG | TEMPERATURE: 97.6 F | DIASTOLIC BLOOD PRESSURE: 67 MMHG | OXYGEN SATURATION: 98 % | RESPIRATION RATE: 18 BRPM | HEIGHT: 60 IN | BODY MASS INDEX: 29.06 KG/M2 | WEIGHT: 148 LBS | HEART RATE: 66 BPM

## 2024-07-29 PROBLEM — Z87.09 HISTORY OF ASTHMA: Status: RESOLVED | Noted: 2024-07-08 | Resolved: 2024-07-29

## 2024-07-29 PROBLEM — Z87.19 HISTORY OF GASTRITIS: Status: RESOLVED | Noted: 2024-07-08 | Resolved: 2024-07-29

## 2024-07-29 PROBLEM — I10 ESSENTIAL (PRIMARY) HYPERTENSION: Chronic | Status: ACTIVE | Noted: 2024-07-23

## 2024-07-29 PROBLEM — Z98.890 S/P CRANIOTOMY: Status: ACTIVE | Noted: 2024-07-29

## 2024-07-29 PROBLEM — Z86.2 HISTORY OF ANEMIA: Status: RESOLVED | Noted: 2024-07-08 | Resolved: 2024-07-29

## 2024-07-29 PROCEDURE — 99024 POSTOP FOLLOW-UP VISIT: CPT

## 2024-07-29 NOTE — REVIEW OF SYSTEMS
[Feeling Tired] : feeling tired [As Noted in HPI] : as noted in HPI [Negative] : Eyes [Fever] : no fever [Chills] : no chills

## 2024-07-29 NOTE — HISTORY OF PRESENT ILLNESS
[FreeTextEntry1] : She visited to [facility] ED today for L side periorbital swelling/bruise since the surgery, unremarkable evaluation and discharged to home with outpatient follow up. [de-identified] : Hospital admission ([facility] on 6/26/24-7/6/24) 43yo F PMHx asthma, anemia, gastritis, presented to [place] ED today c/o HA x 1 day since 12:30pm yesterday after she got off the bus on her way home from work. Pt reports a history of headaches but noted that this headache was more severe and not responding to Tylenol and rest, reports pain is mostly on the right side of the head and neck. Evaluated in [place] ED, given IV Reglan, Meclizine, Tylenol for headache, then CTH performed showing frontal area of SAH with CTA showing concern for possible GEORGE aneurysm. additional finding of lower lying cerebellar tonsils. 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 She was discharged to home with outpatient follow up.  7/8/24 visit Today she reports moderately improving headache with Percocet and denies blurry vision, weakness, seizure activity or any other focal neuro deficits. Images reviewed with the patinet which showed wide neck, irregular L anterior choroidal aneurysm. Discussed surgical interveniot of L craniotomy for aneurysm clipping. (tentative surgery date on 7/24/24)  7/15/24 follow up visit returns after recent admission to hospital where she c/o b/l LE swelling, US doppler revealed small DVT in R calf (unclear chronicity), leg swelling resolved,  medical and vascular team confirmed no need for anticoagulation tx at this time given recent SAH and recommended outpatient follow up of interval surveillance for DVT. Discussed for surgical intervention of aneurysm clipping. tentative surgery schduled on 7/24/24.  She underwent aneurysm clipping, post op hospital stay was uneventful and she was discharged to home on 7/26/24.    Today she reports LE swelling improvement and denies cp, sob, dizziness, n/v, headache, or any other focal neuro deficits.

## 2024-07-29 NOTE — REASON FOR VISIT
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

## 2024-07-29 NOTE — ASSESSMENT
[FreeTextEntry1] : stable post op recovery  PLAN - RTC in one week for staple removal   I, Dr. Geovany Quintana, personally performed the evaluation and management (E/M) services for this established patient who presents today with (a) new problem(s)/exacerbation of (an) existing condition(s). That E/M includes conducting the clinically appropriate interval history &/or exam, assessing all new/exacerbated conditions, and establishing a new plan of care. Today, my VALERY, Hyunchu Lilli-Gold, was here to observe my evaluation and management service for this new problem/exacerbated condition and follow the plan of care established by me going forward.

## 2024-07-30 DIAGNOSIS — Z86.2 PERSONAL HISTORY OF DISEASES OF THE BLOOD AND BLOOD-FORMING ORGANS AND CERTAIN DISORDERS INVOLVING THE IMMUNE MECHANISM: ICD-10-CM

## 2024-07-30 DIAGNOSIS — Z87.19 PERSONAL HISTORY OF OTHER DISEASES OF THE DIGESTIVE SYSTEM: ICD-10-CM

## 2024-07-30 DIAGNOSIS — H57.89 OTHER SPECIFIED DISORDERS OF EYE AND ADNEXA: ICD-10-CM

## 2024-07-30 DIAGNOSIS — J45.909 UNSPECIFIED ASTHMA, UNCOMPLICATED: ICD-10-CM

## 2024-08-06 PROBLEM — Z86.2 HISTORY OF ANEMIA: Status: RESOLVED | Noted: 2024-07-08 | Resolved: 2024-08-06

## 2024-08-06 PROBLEM — Z87.09 HISTORY OF ASTHMA: Status: RESOLVED | Noted: 2024-07-08 | Resolved: 2024-08-06

## 2024-08-06 PROBLEM — Z87.19 HISTORY OF GASTRITIS: Status: RESOLVED | Noted: 2024-07-08 | Resolved: 2024-08-06

## 2024-08-09 ENCOUNTER — APPOINTMENT (OUTPATIENT)
Dept: NEUROSURGERY | Facility: CLINIC | Age: 44
End: 2024-08-09

## 2024-08-09 ENCOUNTER — NON-APPOINTMENT (OUTPATIENT)
Age: 44
End: 2024-08-09

## 2024-08-09 PROCEDURE — 99024 POSTOP FOLLOW-UP VISIT: CPT

## 2024-08-09 NOTE — HISTORY OF PRESENT ILLNESS
[FreeTextEntry1] : non traumatic SAH with R PCOMM infundibulum, L anterior choroidal aneurysm [de-identified] : Hospital admission ([facility] on 6/26/24-7/6/24) 45yo F PMHx asthma, anemia, gastritis, presented to [place] ED today c/o HA x 1 day since 12:30pm yesterday after she got off the bus on her way home from work. Pt reports a history of headaches but noted that this headache was more severe and not responding to Tylenol and rest, reports pain is mostly on the right side of the head and neck. Evaluated in [place] ED, given IV Reglan, Meclizine, Tylenol for headache, then CTH performed showing frontal area of SAH with CTA showing concern for possible GEORGE aneurysm. additional finding of lower lying cerebellar tonsils. 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 She was discharged to home with outpatient follow up.  7/8/24 visit Today she reports moderately improving headache with Percocet and denies blurry vision, weakness, seizure activity or any other focal neuro deficits. Images reviewed with the patinet which showed wide neck, irregular L anterior choroidal aneurysm. Discussed surgical interveniot of L craniotomy for aneurysm clipping. (tentative surgery date on 7/24/24)  7/15/24 follow up visit returns after recent admission to hospital where she c/o b/l LE swelling, US doppler revealed small DVT in R calf (unclear chronicity), leg swelling resolved,  medical and vascular team confirmed no need for anticoagulation tx at this time given recent SAH and recommended outpatient follow up of interval surveillance for DVT. Discussed for surgical intervention of aneurysm clipping. tentative surgery schduled on 7/24/24.  She underwent aneurysm clipping, post op hospital stay was uneventful and she was discharged to home on 7/26/24.  7/29/24 Today she reports LE swelling improvement and denies cp, sob, dizziness, n/v, headache, or any other focal neuro deficits.  She visited to [facility] ED today for L side periorbital swelling/bruise since the surgery, unremarkable evaluation and discharged to home with outpatient follow up. Plan was made to return in one week for staple removal

## 2024-08-09 NOTE — REASON FOR VISIT
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

## 2024-08-09 NOTE — PHYSICAL EXAM
[General Appearance - Alert] : alert [General Appearance - In No Acute Distress] : in no acute distress [General Appearance - Well Nourished] : well nourished [General Appearance - Well-Appearing] : healthy appearing [Longitudinal] : longitudinal [Healing Well] : healing well [No Drainage] : without drainage [Normal Skin] : normal [Oriented To Time, Place, And Person] : oriented to person, place, and time [Impaired Insight] : insight and judgment were intact [Affect] : the affect was normal [Memory Recent] : recent memory was not impaired [FreeTextEntry1] : L frontal temporal head

## 2024-08-09 NOTE — ASSESSMENT
[FreeTextEntry1] : stable post op recovery. Staples removed today without complication.  PLAN - taper down Keppra from 1000mg to 500mg BID - RTC in one month for post op evaluation (no images needed at that time).  I, Dr. Geovany Quintana, personally performed the evaluation and management (E/M) services for this established patient who presents today with (a) new problem(s)/exacerbation of (an) existing condition(s). That E/M includes conducting the clinically appropriate interval history &/or exam, assessing all new/exacerbated conditions, and establishing a new plan of care. Today, my VALERY, Hyunchu Lilli-Gold, was here to observe my evaluation and management service for this new problem/exacerbated condition and follow the plan of care established by me going forward.

## 2024-08-09 NOTE — REASON FOR VISIT
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

## 2024-08-09 NOTE — HISTORY OF PRESENT ILLNESS
[FreeTextEntry1] : non traumatic SAH with R PCOMM infundibulum, L anterior choroidal aneurysm [de-identified] : Hospital admission ([facility] on 6/26/24-7/6/24) 45yo F PMHx asthma, anemia, gastritis, presented to [place] ED today c/o HA x 1 day since 12:30pm yesterday after she got off the bus on her way home from work. Pt reports a history of headaches but noted that this headache was more severe and not responding to Tylenol and rest, reports pain is mostly on the right side of the head and neck. Evaluated in [place] ED, given IV Reglan, Meclizine, Tylenol for headache, then CTH performed showing frontal area of SAH with CTA showing concern for possible GEORGE aneurysm. additional finding of lower lying cerebellar tonsils. 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 She was discharged to home with outpatient follow up.  7/8/24 visit Today she reports moderately improving headache with Percocet and denies blurry vision, weakness, seizure activity or any other focal neuro deficits. Images reviewed with the patinet which showed wide neck, irregular L anterior choroidal aneurysm. Discussed surgical interveniot of L craniotomy for aneurysm clipping. (tentative surgery date on 7/24/24)  7/15/24 follow up visit returns after recent admission to hospital where she c/o b/l LE swelling, US doppler revealed small DVT in R calf (unclear chronicity), leg swelling resolved,  medical and vascular team confirmed no need for anticoagulation tx at this time given recent SAH and recommended outpatient follow up of interval surveillance for DVT. Discussed for surgical intervention of aneurysm clipping. tentative surgery schduled on 7/24/24.  She underwent aneurysm clipping, post op hospital stay was uneventful and she was discharged to home on 7/26/24.  7/29/24 Today she reports LE swelling improvement and denies cp, sob, dizziness, n/v, headache, or any other focal neuro deficits.  She visited to [facility] ED today for L side periorbital swelling/bruise since the surgery, unremarkable evaluation and discharged to home with outpatient follow up. Plan was made to return in one week for staple removal

## 2024-09-06 PROBLEM — Z86.2 HISTORY OF ANEMIA: Status: RESOLVED | Noted: 2024-07-08 | Resolved: 2024-09-06

## 2024-09-06 PROBLEM — Z87.09 HISTORY OF ASTHMA: Status: RESOLVED | Noted: 2024-07-08 | Resolved: 2024-09-06

## 2024-09-06 PROBLEM — Z87.19 HISTORY OF GASTRITIS: Status: RESOLVED | Noted: 2024-07-08 | Resolved: 2024-09-06

## 2024-09-09 ENCOUNTER — APPOINTMENT (OUTPATIENT)
Dept: NEUROSURGERY | Facility: CLINIC | Age: 44
End: 2024-09-09
Payer: MEDICAID

## 2024-09-09 ENCOUNTER — NON-APPOINTMENT (OUTPATIENT)
Age: 44
End: 2024-09-09

## 2024-09-09 VITALS
OXYGEN SATURATION: 98 % | RESPIRATION RATE: 18 BRPM | TEMPERATURE: 97.7 F | DIASTOLIC BLOOD PRESSURE: 93 MMHG | WEIGHT: 148 LBS | HEART RATE: 65 BPM | SYSTOLIC BLOOD PRESSURE: 163 MMHG | BODY MASS INDEX: 29.06 KG/M2 | HEIGHT: 60 IN

## 2024-09-09 DIAGNOSIS — Z87.19 PERSONAL HISTORY OF OTHER DISEASES OF THE DIGESTIVE SYSTEM: ICD-10-CM

## 2024-09-09 DIAGNOSIS — I67.1 CEREBRAL ANEURYSM, NONRUPTURED: ICD-10-CM

## 2024-09-09 DIAGNOSIS — Z87.09 PERSONAL HISTORY OF OTHER DISEASES OF THE RESPIRATORY SYSTEM: ICD-10-CM

## 2024-09-09 DIAGNOSIS — I60.9 NONTRAUMATIC SUBARACHNOID HEMORRHAGE, UNSPECIFIED: ICD-10-CM

## 2024-09-09 DIAGNOSIS — Z98.890 OTHER SPECIFIED POSTPROCEDURAL STATES: ICD-10-CM

## 2024-09-09 DIAGNOSIS — Z86.2 PERSONAL HISTORY OF DISEASES OF THE BLOOD AND BLOOD-FORMING ORGANS AND CERTAIN DISORDERS INVOLVING THE IMMUNE MECHANISM: ICD-10-CM

## 2024-09-09 PROCEDURE — 99024 POSTOP FOLLOW-UP VISIT: CPT

## 2024-09-09 NOTE — REASON FOR VISIT
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

## 2024-09-09 NOTE — ASSESSMENT
[FreeTextEntry1] : stable post op recovery.  PLAN - CTA head routine post-op after aneurysm clipping - f/u after images to review  I, Dr. Geovany Quintana, personally performed the evaluation and management (E/M) services for this established patient who presents today with (a) new problem(s)/exacerbation of (an) existing condition(s). That E/M includes conducting the clinically appropriate interval history &/or exam, assessing all new/exacerbated conditions, and establishing a new plan of care. Today, my VALERY, Hyunchu Lilli-Gold, was here to observe my evaluation and management service for this new problem/exacerbated condition and follow the plan of care established by me going forward.

## 2024-09-09 NOTE — HISTORY OF PRESENT ILLNESS
[de-identified] : Hospital admission ([facility] on 6/26/24-7/6/24) 43yo F PMHx asthma, anemia, gastritis, presented to [facility] ED today c/o HA x 1 day since 12:30pm yesterday after she got off the bus on her way home from work. Pt reports a history of headaches but noted that this headache was more severe and not responding to Tylenol and rest, reports pain is mostly on the right side of the head and neck. Evaluated in [facility] ED, given IV Reglan, Meclizine, Tylenol for headache, then CTH performed showing frontal area of SAH with CTA showing concern for possible GEORGE aneurysm. additional finding of lower lying cerebellar tonsils. 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 She was discharged to home with outpatient follow up.  7/8/24 visit Today she reports moderately improving headache with Percocet and denies blurry vision, weakness, seizure activity or any other focal neuro deficits. Images reviewed with the patinet which showed wide neck, irregular L anterior choroidal aneurysm. Discussed surgical interveniot of L craniotomy for aneurysm clipping. (tentative surgery date on 7/24/24)  7/15/24 follow up visit returns after recent admission to hospital where she c/o b/l LE swelling, US doppler revealed small DVT in R calf (unclear chronicity), leg swelling resolved,  medical and vascular team confirmed no need for anticoagulation tx at this time given recent SAH and recommended outpatient follow up of interval surveillance for DVT. Discussed for surgical intervention of aneurysm clipping. tentative surgery schduled on 7/24/24.  She underwent aneurysm clipping, post op hospital stay was uneventful and she was discharged to home on 7/26/24.  7/29/24 Today she reports LE swelling improvement and denies cp, sob, dizziness, n/v, headache, or any other focal neuro deficits.  She visited to [facility] ED today for L side periorbital swelling/bruise since the surgery, unremarkable evaluation and discharged to home with outpatient follow up. Plan was made to return in one week for staple removal  8/9/24 post op visit return for staple removal, denies any new/worsening focal neuro deficits. Plan was made to taper down Keppra to 500mg BID and RTC in one month  [FreeTextEntry1] : Today she reports sudden onset of past 2 weeks of intermittent blurry vision but denies any other focal neuro deficits.

## 2024-09-09 NOTE — REASON FOR VISIT
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

## 2024-09-09 NOTE — PHYSICAL EXAM
[General Appearance - Alert] : alert [General Appearance - In No Acute Distress] : in no acute distress [General Appearance - Well Nourished] : well nourished [] : normal voice and communication [Longitudinal] : longitudinal [Well-Healed] : well-healed [No Drainage] : without drainage [Normal Skin] : normal [Oriented To Time, Place, And Person] : oriented to person, place, and time [Impaired Insight] : insight and judgment were intact [Affect] : the affect was normal [Memory Recent] : recent memory was not impaired [Erythema] : not erythematous [Tender] : not tender [Warm] : not warm [Indurated] : not indurated [Fluctuant] : not fluctuant [FreeTextEntry1] : L frontotemporal head

## 2024-09-09 NOTE — HISTORY OF PRESENT ILLNESS
[de-identified] : Hospital admission ([facility] on 6/26/24-7/6/24) 43yo F PMHx asthma, anemia, gastritis, presented to [place] ED today c/o HA x 1 day since 12:30pm yesterday after she got off the bus on her way home from work. Pt reports a history of headaches but noted that this headache was more severe and not responding to Tylenol and rest, reports pain is mostly on the right side of the head and neck. Evaluated in [facility] ED, given IV Reglan, Meclizine, Tylenol for headache, then CTH performed showing frontal area of SAH with CTA showing concern for possible GEORGE aneurysm. additional finding of lower lying cerebellar tonsils. 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 She was discharged to home with outpatient follow up.  7/8/24 visit Today she reports moderately improving headache with Percocet and denies blurry vision, weakness, seizure activity or any other focal neuro deficits. Images reviewed with the patinet which showed wide neck, irregular L anterior choroidal aneurysm. Discussed surgical interveniot of L craniotomy for aneurysm clipping. (tentative surgery date on 7/24/24)  7/15/24 follow up visit returns after recent admission to hospital where she c/o b/l LE swelling, US doppler revealed small DVT in R calf (unclear chronicity), leg swelling resolved,  medical and vascular team confirmed no need for anticoagulation tx at this time given recent SAH and recommended outpatient follow up of interval surveillance for DVT. Discussed for surgical intervention of aneurysm clipping. tentative surgery schduled on 7/24/24.  She underwent aneurysm clipping, post op hospital stay was uneventful and she was discharged to home on 7/26/24.  7/29/24 Today she reports LE swelling improvement and denies cp, sob, dizziness, n/v, headache, or any other focal neuro deficits.  She visited to [facility] ED today for L side periorbital swelling/bruise since the surgery, unremarkable evaluation and discharged to home with outpatient follow up. Plan was made to return in one week for staple removal  8/9/24 post op visit return for staple removal, denies any new/worsening focal neuro deficits. Plan was made to taper down Keppra to 500mg BID and RTC in one month  [FreeTextEntry1] : Today she reports sudden onset of past 2 weeks of intermittent blurry vision but denies any other focal neuro deficits.

## 2024-09-19 RX ORDER — LEVETIRACETAM 500 MG/1
500 TABLET, FILM COATED ORAL TWICE DAILY
Qty: 60 | Refills: 0 | Status: ACTIVE | COMMUNITY
Start: 2024-09-19 | End: 1900-01-01

## 2024-10-03 ENCOUNTER — OUTPATIENT (OUTPATIENT)
Dept: OUTPATIENT SERVICES | Facility: HOSPITAL | Age: 44
LOS: 1 days | End: 2024-10-03
Payer: MEDICAID

## 2024-10-03 ENCOUNTER — APPOINTMENT (OUTPATIENT)
Dept: CT IMAGING | Facility: HOSPITAL | Age: 44
End: 2024-10-03

## 2024-10-03 DIAGNOSIS — Z98.890 OTHER SPECIFIED POSTPROCEDURAL STATES: Chronic | ICD-10-CM

## 2024-10-03 DIAGNOSIS — Z98.891 HISTORY OF UTERINE SCAR FROM PREVIOUS SURGERY: Chronic | ICD-10-CM

## 2024-10-03 PROCEDURE — 70496 CT ANGIOGRAPHY HEAD: CPT

## 2024-10-03 PROCEDURE — 82565 ASSAY OF CREATININE: CPT

## 2024-10-03 PROCEDURE — 70496 CT ANGIOGRAPHY HEAD: CPT | Mod: 26

## 2024-10-04 ENCOUNTER — APPOINTMENT (OUTPATIENT)
Dept: HEART AND VASCULAR | Facility: CLINIC | Age: 44
End: 2024-10-04
Payer: MEDICAID

## 2024-10-04 VITALS
HEIGHT: 60 IN | OXYGEN SATURATION: 99 % | TEMPERATURE: 98.2 F | HEART RATE: 80 BPM | SYSTOLIC BLOOD PRESSURE: 160 MMHG | WEIGHT: 145 LBS | BODY MASS INDEX: 28.47 KG/M2 | DIASTOLIC BLOOD PRESSURE: 90 MMHG

## 2024-10-04 PROCEDURE — 93970 EXTREMITY STUDY: CPT

## 2024-10-08 ENCOUNTER — APPOINTMENT (OUTPATIENT)
Dept: HEART AND VASCULAR | Facility: CLINIC | Age: 44
End: 2024-10-08
Payer: MEDICAID

## 2024-10-08 VITALS
TEMPERATURE: 98.5 F | HEART RATE: 85 BPM | OXYGEN SATURATION: 98 % | HEIGHT: 60 IN | DIASTOLIC BLOOD PRESSURE: 93 MMHG | WEIGHT: 145.99 LBS | BODY MASS INDEX: 28.66 KG/M2 | SYSTOLIC BLOOD PRESSURE: 144 MMHG

## 2024-10-08 PROCEDURE — 99214 OFFICE O/P EST MOD 30 MIN: CPT

## 2024-10-13 PROBLEM — Z87.09 HISTORY OF ASTHMA: Status: RESOLVED | Noted: 2024-07-08 | Resolved: 2024-10-13

## 2024-10-13 PROBLEM — Z87.19 HISTORY OF GASTRITIS: Status: RESOLVED | Noted: 2024-07-08 | Resolved: 2024-10-13

## 2024-10-13 PROBLEM — Z86.2 HISTORY OF ANEMIA: Status: RESOLVED | Noted: 2024-07-08 | Resolved: 2024-10-13

## 2024-10-14 ENCOUNTER — APPOINTMENT (OUTPATIENT)
Dept: NEUROSURGERY | Facility: CLINIC | Age: 44
End: 2024-10-14
Payer: MEDICAID

## 2024-10-14 VITALS
DIASTOLIC BLOOD PRESSURE: 89 MMHG | HEIGHT: 60 IN | HEART RATE: 84 BPM | BODY MASS INDEX: 28.47 KG/M2 | WEIGHT: 145 LBS | SYSTOLIC BLOOD PRESSURE: 152 MMHG | RESPIRATION RATE: 18 BRPM | OXYGEN SATURATION: 98 %

## 2024-10-14 DIAGNOSIS — I60.9 NONTRAUMATIC SUBARACHNOID HEMORRHAGE, UNSPECIFIED: ICD-10-CM

## 2024-10-14 DIAGNOSIS — Z86.2 PERSONAL HISTORY OF DISEASES OF THE BLOOD AND BLOOD-FORMING ORGANS AND CERTAIN DISORDERS INVOLVING THE IMMUNE MECHANISM: ICD-10-CM

## 2024-10-14 DIAGNOSIS — Z98.890 OTHER SPECIFIED POSTPROCEDURAL STATES: ICD-10-CM

## 2024-10-14 DIAGNOSIS — D32.0 BENIGN NEOPLASM OF CEREBRAL MENINGES: ICD-10-CM

## 2024-10-14 DIAGNOSIS — I67.1 CEREBRAL ANEURYSM, NONRUPTURED: ICD-10-CM

## 2024-10-14 DIAGNOSIS — Z87.19 PERSONAL HISTORY OF OTHER DISEASES OF THE DIGESTIVE SYSTEM: ICD-10-CM

## 2024-10-14 DIAGNOSIS — Z87.09 PERSONAL HISTORY OF OTHER DISEASES OF THE RESPIRATORY SYSTEM: ICD-10-CM

## 2024-10-14 PROCEDURE — 99215 OFFICE O/P EST HI 40 MIN: CPT | Mod: 24,57

## 2024-11-13 ENCOUNTER — OUTPATIENT (OUTPATIENT)
Dept: OUTPATIENT SERVICES | Facility: HOSPITAL | Age: 44
LOS: 1 days | End: 2024-11-13
Payer: MEDICAID

## 2024-11-13 ENCOUNTER — APPOINTMENT (OUTPATIENT)
Dept: MRI IMAGING | Facility: HOSPITAL | Age: 44
End: 2024-11-13

## 2024-11-13 DIAGNOSIS — Z98.890 OTHER SPECIFIED POSTPROCEDURAL STATES: Chronic | ICD-10-CM

## 2024-11-13 DIAGNOSIS — Z98.891 HISTORY OF UTERINE SCAR FROM PREVIOUS SURGERY: Chronic | ICD-10-CM

## 2024-11-13 PROCEDURE — A9585: CPT

## 2024-11-13 PROCEDURE — 70553 MRI BRAIN STEM W/O & W/DYE: CPT

## 2024-11-13 PROCEDURE — 70553 MRI BRAIN STEM W/O & W/DYE: CPT | Mod: 26

## 2024-11-30 PROBLEM — Z87.19 HISTORY OF GASTRITIS: Status: RESOLVED | Noted: 2024-07-08 | Resolved: 2024-11-30

## 2024-11-30 PROBLEM — Z86.2 HISTORY OF ANEMIA: Status: RESOLVED | Noted: 2024-07-08 | Resolved: 2024-11-30

## 2024-11-30 PROBLEM — Z87.09 HISTORY OF ASTHMA: Status: RESOLVED | Noted: 2024-07-08 | Resolved: 2024-11-30

## 2024-12-02 ENCOUNTER — APPOINTMENT (OUTPATIENT)
Dept: NEUROSURGERY | Facility: CLINIC | Age: 44
End: 2024-12-02
Payer: MEDICAID

## 2024-12-02 VITALS
DIASTOLIC BLOOD PRESSURE: 78 MMHG | SYSTOLIC BLOOD PRESSURE: 116 MMHG | TEMPERATURE: 98.1 F | HEIGHT: 60 IN | BODY MASS INDEX: 28.47 KG/M2 | WEIGHT: 145 LBS | OXYGEN SATURATION: 98 % | HEART RATE: 68 BPM | RESPIRATION RATE: 18 BRPM

## 2024-12-02 DIAGNOSIS — Z86.2 PERSONAL HISTORY OF DISEASES OF THE BLOOD AND BLOOD-FORMING ORGANS AND CERTAIN DISORDERS INVOLVING THE IMMUNE MECHANISM: ICD-10-CM

## 2024-12-02 DIAGNOSIS — Z87.19 PERSONAL HISTORY OF OTHER DISEASES OF THE DIGESTIVE SYSTEM: ICD-10-CM

## 2024-12-02 DIAGNOSIS — Z98.890 OTHER SPECIFIED POSTPROCEDURAL STATES: ICD-10-CM

## 2024-12-02 DIAGNOSIS — I67.1 CEREBRAL ANEURYSM, NONRUPTURED: ICD-10-CM

## 2024-12-02 DIAGNOSIS — D32.0 BENIGN NEOPLASM OF CEREBRAL MENINGES: ICD-10-CM

## 2024-12-02 DIAGNOSIS — Z87.09 PERSONAL HISTORY OF OTHER DISEASES OF THE RESPIRATORY SYSTEM: ICD-10-CM

## 2024-12-02 PROCEDURE — 99215 OFFICE O/P EST HI 40 MIN: CPT

## 2025-07-14 ENCOUNTER — NON-APPOINTMENT (OUTPATIENT)
Age: 45
End: 2025-07-14